# Patient Record
Sex: MALE | Race: WHITE | NOT HISPANIC OR LATINO | Employment: OTHER
[De-identification: names, ages, dates, MRNs, and addresses within clinical notes are randomized per-mention and may not be internally consistent; named-entity substitution may affect disease eponyms.]

---

## 2019-01-10 ENCOUNTER — RECORDS - HEALTHEAST (OUTPATIENT)
Dept: ADMINISTRATIVE | Facility: OTHER | Age: 79
End: 2019-01-10

## 2019-01-11 ENCOUNTER — HOSPITAL ENCOUNTER (OUTPATIENT)
Dept: ULTRASOUND IMAGING | Facility: CLINIC | Age: 79
Discharge: HOME | End: 2019-01-11
Attending: SURGERY

## 2019-01-11 DIAGNOSIS — K40.90 INGUINAL HERNIA: ICD-10-CM

## 2019-01-15 ENCOUNTER — RECORDS - HEALTHEAST (OUTPATIENT)
Dept: LAB | Facility: CLINIC | Age: 79
End: 2019-01-15

## 2019-01-15 LAB
AST SERPL W P-5'-P-CCNC: 21 U/L (ref 0–40)
POTASSIUM BLD-SCNC: 4 MMOL/L (ref 3.5–5)

## 2020-01-06 ENCOUNTER — RECORDS - HEALTHEAST (OUTPATIENT)
Dept: LAB | Facility: CLINIC | Age: 80
End: 2020-01-06

## 2020-01-06 LAB
ALBUMIN SERPL-MCNC: 4 G/DL (ref 3.5–5)
ALP SERPL-CCNC: 82 U/L (ref 45–120)
ALT SERPL W P-5'-P-CCNC: 32 U/L (ref 0–45)
ANION GAP SERPL CALCULATED.3IONS-SCNC: 10 MMOL/L (ref 5–18)
AST SERPL W P-5'-P-CCNC: 25 U/L (ref 0–40)
BILIRUB SERPL-MCNC: 0.6 MG/DL (ref 0–1)
BUN SERPL-MCNC: 17 MG/DL (ref 8–28)
CALCIUM SERPL-MCNC: 9 MG/DL (ref 8.5–10.5)
CHLORIDE BLD-SCNC: 106 MMOL/L (ref 98–107)
CHOLEST SERPL-MCNC: 181 MG/DL
CO2 SERPL-SCNC: 26 MMOL/L (ref 22–31)
CREAT SERPL-MCNC: 1.1 MG/DL (ref 0.7–1.3)
FASTING STATUS PATIENT QL REPORTED: YES
GFR SERPL CREATININE-BSD FRML MDRD: >60 ML/MIN/1.73M2
GLUCOSE BLD-MCNC: 94 MG/DL (ref 70–125)
HDLC SERPL-MCNC: 55 MG/DL
LDLC SERPL CALC-MCNC: 104 MG/DL
POTASSIUM BLD-SCNC: 3.9 MMOL/L (ref 3.5–5)
PROT SERPL-MCNC: 6.2 G/DL (ref 6–8)
PSA SERPL-MCNC: 5.6 NG/ML (ref 0–6.5)
SODIUM SERPL-SCNC: 142 MMOL/L (ref 136–145)
TRIGL SERPL-MCNC: 110 MG/DL

## 2020-09-30 ENCOUNTER — AMBULATORY - HEALTHEAST (OUTPATIENT)
Dept: SURGERY | Facility: CLINIC | Age: 80
End: 2020-09-30

## 2020-09-30 DIAGNOSIS — Z11.59 ENCOUNTER FOR SCREENING FOR OTHER VIRAL DISEASES: ICD-10-CM

## 2020-10-01 ENCOUNTER — AMBULATORY - HEALTHEAST (OUTPATIENT)
Dept: LAB | Facility: CLINIC | Age: 80
End: 2020-10-01

## 2020-10-01 ENCOUNTER — COMMUNICATION - HEALTHEAST (OUTPATIENT)
Dept: TELEHEALTH | Facility: CLINIC | Age: 80
End: 2020-10-01

## 2020-10-01 DIAGNOSIS — Z11.59 ENCOUNTER FOR SCREENING FOR OTHER VIRAL DISEASES: ICD-10-CM

## 2020-10-01 LAB
SARS-COV-2 PCR COMMENT: NORMAL
SARS-COV-2 RNA SPEC QL NAA+PROBE: NEGATIVE
SARS-COV-2 VIRUS SPECIMEN SOURCE: NORMAL

## 2020-10-02 ENCOUNTER — COMMUNICATION - HEALTHEAST (OUTPATIENT)
Dept: SCHEDULING | Facility: CLINIC | Age: 80
End: 2020-10-02

## 2020-10-05 ENCOUNTER — AMBULATORY - HEALTHEAST (OUTPATIENT)
Dept: SURGERY | Facility: CLINIC | Age: 80
End: 2020-10-05

## 2020-10-05 ENCOUNTER — COMMUNICATION - HEALTHEAST (OUTPATIENT)
Dept: SCHEDULING | Facility: CLINIC | Age: 80
End: 2020-10-05

## 2020-10-05 ENCOUNTER — AMBULATORY - HEALTHEAST (OUTPATIENT)
Dept: LAB | Facility: CLINIC | Age: 80
End: 2020-10-05

## 2020-10-05 DIAGNOSIS — Z11.59 ENCOUNTER FOR SCREENING FOR OTHER VIRAL DISEASES: ICD-10-CM

## 2020-10-06 ENCOUNTER — ANESTHESIA - HEALTHEAST (OUTPATIENT)
Dept: SURGERY | Facility: CLINIC | Age: 80
End: 2020-10-06

## 2020-10-06 ENCOUNTER — SURGERY - HEALTHEAST (OUTPATIENT)
Dept: SURGERY | Facility: CLINIC | Age: 80
End: 2020-10-06

## 2020-10-06 ASSESSMENT — MIFFLIN-ST. JEOR: SCORE: 1592.16

## 2020-10-12 ENCOUNTER — TELEPHONE (OUTPATIENT)
Dept: NURSING | Facility: CLINIC | Age: 80
End: 2020-10-12

## 2020-10-12 NOTE — TELEPHONE ENCOUNTER
Answered questions and advised to call back for further questions or problems.  Irma Price RN  [facility]

## 2021-02-03 ENCOUNTER — TRANSFERRED RECORDS (OUTPATIENT)
Dept: HEALTH INFORMATION MANAGEMENT | Facility: CLINIC | Age: 81
End: 2021-02-03

## 2021-02-03 ENCOUNTER — RECORDS - HEALTHEAST (OUTPATIENT)
Dept: LAB | Facility: CLINIC | Age: 81
End: 2021-02-03

## 2021-02-03 LAB
ALBUMIN SERPL-MCNC: 4 G/DL (ref 3.5–5)
ALP SERPL-CCNC: 83 U/L (ref 45–120)
ALT SERPL W P-5'-P-CCNC: 25 U/L (ref 0–45)
ANION GAP SERPL CALCULATED.3IONS-SCNC: 7 MMOL/L (ref 5–18)
AST SERPL W P-5'-P-CCNC: 20 U/L (ref 0–40)
BILIRUB SERPL-MCNC: 0.5 MG/DL (ref 0–1)
BUN SERPL-MCNC: 18 MG/DL (ref 8–28)
CALCIUM SERPL-MCNC: 8.7 MG/DL (ref 8.5–10.5)
CHLORIDE BLD-SCNC: 109 MMOL/L (ref 98–107)
CHOLEST SERPL-MCNC: 191 MG/DL
CO2 SERPL-SCNC: 26 MMOL/L (ref 22–31)
CREAT SERPL-MCNC: 1.09 MG/DL (ref 0.7–1.3)
FASTING STATUS PATIENT QL REPORTED: YES
GFR SERPL CREATININE-BSD FRML MDRD: >60 ML/MIN/1.73M2
GLUCOSE BLD-MCNC: 90 MG/DL (ref 70–125)
HDLC SERPL-MCNC: 54 MG/DL
LDLC SERPL CALC-MCNC: 118 MG/DL
POTASSIUM BLD-SCNC: 4 MMOL/L (ref 3.5–5)
PROT SERPL-MCNC: 6.1 G/DL (ref 6–8)
PSA SERPL-MCNC: 7 NG/ML (ref 0–6.5)
SODIUM SERPL-SCNC: 142 MMOL/L (ref 136–145)
TRIGL SERPL-MCNC: 93 MG/DL

## 2021-03-09 ENCOUNTER — TRANSFERRED RECORDS (OUTPATIENT)
Dept: HEALTH INFORMATION MANAGEMENT | Facility: CLINIC | Age: 81
End: 2021-03-09

## 2021-04-16 ENCOUNTER — TRANSFERRED RECORDS (OUTPATIENT)
Dept: HEALTH INFORMATION MANAGEMENT | Facility: CLINIC | Age: 81
End: 2021-04-16

## 2021-04-28 ENCOUNTER — TRANSFERRED RECORDS (OUTPATIENT)
Dept: HEALTH INFORMATION MANAGEMENT | Facility: CLINIC | Age: 81
End: 2021-04-28

## 2021-05-17 ENCOUNTER — RECORDS - HEALTHEAST (OUTPATIENT)
Dept: LAB | Facility: CLINIC | Age: 81
End: 2021-05-17

## 2021-05-17 LAB
ANION GAP SERPL CALCULATED.3IONS-SCNC: 11 MMOL/L (ref 5–18)
BUN SERPL-MCNC: 20 MG/DL (ref 8–28)
CALCIUM SERPL-MCNC: 8.8 MG/DL (ref 8.5–10.5)
CHLORIDE BLD-SCNC: 108 MMOL/L (ref 98–107)
CO2 SERPL-SCNC: 26 MMOL/L (ref 22–31)
CREAT SERPL-MCNC: 1.21 MG/DL (ref 0.7–1.3)
GFR SERPL CREATININE-BSD FRML MDRD: 58 ML/MIN/1.73M2
GLUCOSE BLD-MCNC: 91 MG/DL (ref 70–125)
POTASSIUM BLD-SCNC: 4.4 MMOL/L (ref 3.5–5)
SODIUM SERPL-SCNC: 145 MMOL/L (ref 136–145)

## 2021-05-25 ENCOUNTER — RECORDS - HEALTHEAST (OUTPATIENT)
Dept: LAB | Facility: CLINIC | Age: 81
End: 2021-05-25

## 2021-05-27 ENCOUNTER — TRANSFERRED RECORDS (OUTPATIENT)
Dept: HEALTH INFORMATION MANAGEMENT | Facility: CLINIC | Age: 81
End: 2021-05-27

## 2021-05-29 ENCOUNTER — RECORDS - HEALTHEAST (OUTPATIENT)
Dept: ADMINISTRATIVE | Facility: CLINIC | Age: 81
End: 2021-05-29

## 2021-06-05 VITALS — BODY MASS INDEX: 24.91 KG/M2 | HEIGHT: 72 IN | WEIGHT: 183.9 LBS

## 2021-06-12 NOTE — ANESTHESIA CARE TRANSFER NOTE
Last vitals:   Vitals:    10/06/20 1234   BP: 152/78   Pulse: 79   Resp: 16   Temp: 36.8  C (98.2  F)   SpO2: 100%     Patient's level of consciousness is drowsy  Spontaneous respirations: yes  Maintains airway independently: yes  Dentition unchanged: yes  Oropharynx: oropharynx clear of all foreign objects    QCDR Measures:  ASA# 20 - Surgical Safety Checklist: WHO surgical safety checklist completed prior to induction    PQRS# 430 - Adult PONV Prevention: 4558F - Pt received => 2 anti-emetic agents (different classes) preop & intraop  ASA# 8 - Peds PONV Prevention: NA - Not pediatric patient, not GA or 2 or more risk factors NOT present  PQRS# 424 - Jolene-op Temp Management: 4559F - At least one body temp DOCUMENTED => 35.5C or 95.9F within required timeframe  PQRS# 426 - PACU Transfer Protocol: - Transfer of care checklist used  ASA# 14 - Acute Post-op Pain: ASA14B - Patient did NOT experience pain >= 7 out of 10

## 2021-06-12 NOTE — ANESTHESIA PREPROCEDURE EVALUATION
Anesthesia Evaluation      Patient summary reviewed     Airway   Mallampati: III  Neck ROM: full   Pulmonary - negative ROS and normal exam                          Cardiovascular - normal exam  Exercise tolerance: > or = 4 METS  (+) hypertension, , hypercholesterolemia,     ECG reviewed        Neuro/Psych    (+) neuromuscular disease,      Endo/Other - negative ROS      GI/Hepatic/Renal - negative ROS           Dental - normal exam                        Anesthesia Plan  Planned anesthetic: general LMA  50 mg ketamine IV on induction.    ASA 2   Induction: intravenous   Anesthetic plan and risks discussed with: patient  Anesthesia plan special considerations: antiemetics,   Post-op plan: routine recovery

## 2021-06-12 NOTE — ANESTHESIA POSTPROCEDURE EVALUATION
Patient: Chet Kathleen  Procedure(s):  BILATERAL INGUINAL HERNIA REPAIR (Bilateral)  Anesthesia type: general    Patient location: Phase II Recovery  Last vitals: No vitals data found for the desired time range.    Post vital signs: stable  Level of consciousness: awake and responds to simple questions  Post-anesthesia pain: pain controlled  Post-anesthesia nausea and vomiting: no  Pulmonary: unassisted, return to baseline  Cardiovascular: stable and blood pressure at baseline  Hydration: adequate  Anesthetic events: no    QCDR Measures:  ASA# 11 - Jolene-op Cardiac Arrest: ASA11B - Patient did NOT experience unanticipated cardiac arrest  ASA# 12 - Jolene-op Mortality Rate: ASA12B - Patient did NOT die  ASA# 13 - PACU Re-Intubation Rate: ASA13B - Patient did NOT require a new airway mgmt  ASA# 10 - Composite Anes Safety: ASA10A - No serious adverse event    Additional Notes:

## 2022-01-27 ENCOUNTER — TRANSFERRED RECORDS (OUTPATIENT)
Dept: HEALTH INFORMATION MANAGEMENT | Facility: CLINIC | Age: 82
End: 2022-01-27

## 2022-03-31 ENCOUNTER — LAB REQUISITION (OUTPATIENT)
Dept: LAB | Facility: CLINIC | Age: 82
End: 2022-03-31
Payer: COMMERCIAL

## 2022-03-31 DIAGNOSIS — I10 ESSENTIAL (PRIMARY) HYPERTENSION: ICD-10-CM

## 2022-03-31 DIAGNOSIS — E78.5 HYPERLIPIDEMIA, UNSPECIFIED: ICD-10-CM

## 2022-03-31 LAB
ALBUMIN SERPL-MCNC: 3.9 G/DL (ref 3.5–5)
ALP SERPL-CCNC: 88 U/L (ref 45–120)
ALT SERPL W P-5'-P-CCNC: 24 U/L (ref 0–45)
ANION GAP SERPL CALCULATED.3IONS-SCNC: 12 MMOL/L (ref 5–18)
AST SERPL W P-5'-P-CCNC: 20 U/L (ref 0–40)
BILIRUB SERPL-MCNC: 0.6 MG/DL (ref 0–1)
BUN SERPL-MCNC: 23 MG/DL (ref 8–28)
CALCIUM SERPL-MCNC: 9.1 MG/DL (ref 8.5–10.5)
CHLORIDE BLD-SCNC: 108 MMOL/L (ref 98–107)
CHOLEST SERPL-MCNC: 190 MG/DL
CO2 SERPL-SCNC: 24 MMOL/L (ref 22–31)
CREAT SERPL-MCNC: 1.23 MG/DL (ref 0.7–1.3)
FASTING STATUS PATIENT QL REPORTED: NORMAL
GFR SERPL CREATININE-BSD FRML MDRD: 60 ML/MIN/1.73M2
GLUCOSE BLD-MCNC: 94 MG/DL (ref 70–125)
HDLC SERPL-MCNC: 52 MG/DL
LDLC SERPL CALC-MCNC: 121 MG/DL
POTASSIUM BLD-SCNC: 4.3 MMOL/L (ref 3.5–5)
PROT SERPL-MCNC: 6.5 G/DL (ref 6–8)
SODIUM SERPL-SCNC: 144 MMOL/L (ref 136–145)
TRIGL SERPL-MCNC: 84 MG/DL

## 2022-03-31 PROCEDURE — 80061 LIPID PANEL: CPT | Mod: ORL | Performed by: FAMILY MEDICINE

## 2022-03-31 PROCEDURE — 80053 COMPREHEN METABOLIC PANEL: CPT | Mod: ORL | Performed by: FAMILY MEDICINE

## 2022-05-26 ENCOUNTER — TRANSFERRED RECORDS (OUTPATIENT)
Dept: HEALTH INFORMATION MANAGEMENT | Facility: CLINIC | Age: 82
End: 2022-05-26

## 2022-06-29 ENCOUNTER — LAB REQUISITION (OUTPATIENT)
Dept: LAB | Facility: CLINIC | Age: 82
End: 2022-06-29
Payer: COMMERCIAL
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
## 2022-06-29 PROCEDURE — 86618 LYME DISEASE ANTIBODY: CPT | Mod: ORL | Performed by: FAMILY MEDICINE

## 2022-06-30 LAB — B BURGDOR IGG+IGM SER QL: 0.03

## 2022-11-11 ENCOUNTER — TRANSFERRED RECORDS (OUTPATIENT)
Dept: HEALTH INFORMATION MANAGEMENT | Facility: CLINIC | Age: 82
End: 2022-11-11

## 2022-11-16 ENCOUNTER — TRANSFERRED RECORDS (OUTPATIENT)
Dept: HEALTH INFORMATION MANAGEMENT | Facility: CLINIC | Age: 82
End: 2022-11-16

## 2022-11-18 ENCOUNTER — TRANSFERRED RECORDS (OUTPATIENT)
Dept: HEALTH INFORMATION MANAGEMENT | Facility: CLINIC | Age: 82
End: 2022-11-18

## 2022-11-29 ENCOUNTER — TRANSCRIBE ORDERS (OUTPATIENT)
Dept: ONCOLOGY | Facility: CLINIC | Age: 82
End: 2022-11-29

## 2022-11-29 ENCOUNTER — PATIENT OUTREACH (OUTPATIENT)
Dept: ONCOLOGY | Facility: CLINIC | Age: 82
End: 2022-11-29

## 2022-11-29 DIAGNOSIS — C61 MALIGNANT NEOPLASM OF PROSTATE (H): Primary | ICD-10-CM

## 2022-11-29 NOTE — PROGRESS NOTES
New Patient Oncology Nurse Navigator Note     Referring provider:   Gabino Martines MD   [facility]     Referred to (specialty): Medical Oncology & Radiation Oncology    Requested provider (if applicable): Dr. Watt & Dr. Evans     Date Referral Received: 11/29/22     Evaluation for : malignant tumor of prostate- high grade Delfino 9 disease with T3 disease at prostatectomy        Clinical History (per Nurse review of records provided):      Place of Service: [facility]   Admission Date: 5/27/2021  Surgeon: Dr. Gabino Martines  Surgical Procedure: Radical prostatectomy with pelvic lymph node biopsy    Rising PSA    Records Location (Care Everywhere, Media, etc.): Care Everywhere  [facility]      Records Needed: [facility]    I called Chet to discuss referral to oncology.  I spoke with both he and his wife.   Chet would like to be seen at  location.  It was unclear initially if med onc was needed as it came through as a referral to Dr. Watt.  We have the following plan:  Dr. Evans, 12/7/22, 1:30-2:30 pm, NP in person    I reviewed what to expect at this visit and have given the clinic phone number.  I let him know our scheduling team will be reaching out to finalize.  No other questions at this time.

## 2022-12-02 ENCOUNTER — TRANSFERRED RECORDS (OUTPATIENT)
Dept: HEALTH INFORMATION MANAGEMENT | Facility: CLINIC | Age: 82
End: 2022-12-02

## 2022-12-02 NOTE — TELEPHONE ENCOUNTER
Imaging Received  2022 8:24 AM ABT   Action: Images from [facility] received and resolved to PACS.     Action 2022 9:22 AM ABT   Action Taken Called and spoke to pt spouse, patient is not awake and took message for patient to return call back.    9:35 AM  Patient states that he has never had radiation treatment but had an appt with [facility] a few weeks ago w/ Dr. Robbin Collado w/ [facility] HIM states that PJ is needed.     PJ sent to pt email [email or web address]    10:37AM  PJ received and sent to [facility] for [facility]. PJ sent to HIM for upload    3:58 PM  [facility] and [facility] received and sent to HIM for upload     MEDICAL RECORDS REQUEST   Radiation Oncology  [address] [place] [address]  PHONE: [phone]  Fax: [phone]        FUTURE VISIT INFORMATION                                                   Chet Kathleen, : 1940 scheduled for future visit at [facility]    APPOINTMENT INFORMATION:    Date: 22    Provider:  Dr. Deneen Evans    Reason for Visit/Diagnosis: Malignant neoplasm of prostate (H) [C61]    RECORDS REQUESTED FOR VISIT                                                     NOTES STATUS DETAILS   OFFICE NOTE from referring provider Rec -[facility] 22: Dr. Gabino Martines   OPERATIVE REPORT [facility] 21: DAVINCI ROBOTIC PROSTATECTOMY, BILATERAL PELVIC LYMPH NODE DISSECTION  21: Prostate biopsy   MEDICATION LIST     LABS     PATHOLOGY REPORTS Report in [facility]:  21: [id number]    [facility]:  21: MS-   ANYTHING RELATED TO DIAGNOSIS Epic Most recent 22   IMAGING (NEED IMAGES & REPORT)     CT SCANS PACS [facility]:  22, 21: CT Abd Pel   XRAYS PACS [facility]:  21: [place] Bone Scan   PREVIOUS RADIATION  (consult only)   WHAT HEALTHCARE FACILITY External: [facility] Dr. Siegel   RADIATION ONCOLOGIST NOTES  22: Dr. Maya Siegel   RADIATION TREATMENT SUMMARY NOTES     RADIATION  TREATMENT PLAN     DVH = DOSE VOLUME HISTOGRAM     DICOM CD (TX PLANNING CT, TX PLAN, STRUCTURE SET) *If no DICOM avail ask for DRRs     *[facility] and [facility] patients to [facility] with ATTN: FRANKLIN/Gissell Dosi/Physics    *only [facility] patient's, use [facility]

## 2022-12-07 ENCOUNTER — OFFICE VISIT (OUTPATIENT)
Dept: RADIATION ONCOLOGY | Facility: CLINIC | Age: 82
End: 2022-12-07
Attending: UROLOGY
Payer: COMMERCIAL

## 2022-12-07 ENCOUNTER — PRE VISIT (OUTPATIENT)
Dept: RADIATION ONCOLOGY | Facility: CLINIC | Age: 82
End: 2022-12-07

## 2022-12-07 VITALS
SYSTOLIC BLOOD PRESSURE: 137 MMHG | RESPIRATION RATE: 16 BRPM | DIASTOLIC BLOOD PRESSURE: 79 MMHG | HEART RATE: 67 BPM | OXYGEN SATURATION: 96 % | TEMPERATURE: 98.1 F

## 2022-12-07 DIAGNOSIS — C61 MALIGNANT NEOPLASM OF PROSTATE (H): ICD-10-CM

## 2022-12-07 PROCEDURE — G0463 HOSPITAL OUTPT CLINIC VISIT: HCPCS

## 2022-12-07 PROCEDURE — 99205 OFFICE O/P NEW HI 60 MIN: CPT | Performed by: RADIOLOGY

## 2022-12-07 RX ORDER — EPINEPHRINE 0.3 MG/.3ML
0.3 INJECTION SUBCUTANEOUS
COMMUNITY
End: 2023-04-05

## 2022-12-07 RX ORDER — LISINOPRIL 10 MG/1
TABLET ORAL
COMMUNITY

## 2022-12-07 RX ORDER — LORATADINE 10 MG/1
10 TABLET ORAL
COMMUNITY

## 2022-12-07 RX ORDER — ZOLPIDEM TARTRATE 5 MG/1
TABLET ORAL
COMMUNITY

## 2022-12-07 NOTE — PROGRESS NOTES
Patient here ambulatory accompanied by wife, Gurinder, for radiation consult for his recurrent prostate cancer.  Patient having rising PSA.  15 minutes spent in review of radiation process and potential side effects.  Written information given for review.  Seen by Dr. Evans.  Plan return to clinic for follow up as directed by physician.    No pacemaker  No prior radiation therapy    Oncology Rooming Note    December 7, 2022 1:49 PM   Chet Kathleen is a 82 year old male who presents for:    Chief Complaint   Patient presents with     Oncology Clinic Visit     Consult with Dr. Evans     Initial Vitals: /79 (BP Location: Right arm, Patient Position: Sitting, Cuff Size: Adult Regular)   Pulse 67   Temp 98.1  F (36.7  C) (Oral)   Resp 16   SpO2 96%  Estimated body mass index is 24.94 kg/m  as calculated from the following:    Height as of 10/6/20: 1.829 m (6').    Weight as of 10/6/20: 83.4 kg (183 lb 14.4 oz). There is no height or weight on file to calculate BSA.  No Pain (0) Comment: Data Unavailable   No LMP for male patient.  Allergies reviewed: Yes  Medications reviewed: Yes    Medications: Medication refills not needed today.  Pharmacy name entered into [employer]: [id number] [place] [id number] [facility] [address] [facility]    Clinical concerns: Consult for prostate cancer, seen by Dr. Siegel but wanted daily treatment closer to home Dr. Evans was notified.     Radiation Therapy Patient Education    Person involved with teaching: Patient and Wife    Patient educational needs for self management of treatment-related side effects assessment completed.  [facility] Patient Ed tab contains Patient Learning Assessment    Education Materials Given  Radiation Therapy and You, Understanding Radiation Therapy, Radiation Therapy Team, Radiation Therapy Treatment, Radiation Therapy: Managing Short-Term Side Effects, Skin Care During Radiation Therapy, Radiation Therapy: Your Daily Life,  Full Bladder Instructions, Radiation Therapy to the Male Pelvis Guidelines, Welcome Letter, Insurance PA Information and Map [facility]    Educational Topics Discussed  Side effects expected and Activity    Response To Teaching  More review necessary    GYN Only  Vaginal Dilator-given and educated: N/A    Referrals sent: None    Chemotherapy?  No          Serena Daigle RN

## 2022-12-07 NOTE — LETTER
12/7/2022         RE: Chet Kathleen  [address] [place] [address]        Dear Colleague,    Thank you for referring your patient, Chet Kathleen, to the [facility] RADIATION ONCOLOGY [place]. Please see a copy of my visit note below.    [facility] Radiation Oncology Consult Note     Patient: Chet Kathleen  MRN: [id number]  Date of Service: 12/07/2022          Gabino Martines MD  [facility] UROLOGY  [address]  [place] [address]       Dear Dr. Martines:    Thank you very much for referring this patient for consideration of radiotherapy. As you know Mr. Kathleen is a 82 year old male with a diagnosis of high risk prostate cancer, pathologic stage T3b N0 M0, Crawford grade 4+5 = 9 involving both side of prostate gland and a pretherapy PSA of 7.0.  Patient is status post robotic assisted prostatectomy with pathology showed evidence of seminal vesicle invasion and positive margin in the left anterior apex with 4 negative lymph nodes.  Patient presented with a recent history of rising PSA to 0.14.  He is referred to radiation oncology for evaluation and consideration of salvage radiation therapy.    HISTORY OF PRESENT ILLNESS:   Mr. Kathleen is a 82 year old male who has been in his usual state of health until recently.  The patient presented with abnormal elevation of PSA from 5.6 in 1/2020 to 7.0 on 2/3/2021 for which he was seeking further evaluation.  Transrectal ultrasound study and biopsy on 4/16/2021 reviewed adenocarcinoma, Delfino grade 4+5 = 9, 4+3 = 7 and 3+3 = 6 involving both side of prostate gland.  Patient underwent staging work-up including CT abdomen pelvis and bone scan on 4/28/2021 which showed no evidence of lymph nodes and systemic metastasis.  Patient received robotic assisted radical prostatectomy on 5/27/2021.  The pathology revealed prostatic adenocarcinoma, Crawford grade 4+5 = 9 involving both side of prostate gland with evidence of extraprostatic  extension in the right anterior base and the left anterior apex, tumor invasion into left and right seminal vesicle and right vas deferens, and positive margin in the left anterior apex.  There is also evidence of lymphovascular invasion.  4 removed pelvic lymph nodes showed no evidence of metastatic disease.  Patient has been recovering well since surgery.  He has only occasional stress incontinence and wearing a pad as needed.  He is PSA has been undetectable since surgery.  He was find to have detectable PSA to 0.14 on 11/11/2022.  There is no additional staging work-up done. The patient is referred to radiation oncology for evaluation and consideration of possible salvage radiation therapy.    CHEMOTHERAPY HISTORY: Concurrent Chemotherapy: No    RADIATION THERAPY HISTORY: Prior Radiation: No    IMPLANTED CARDIAC DEVICE: none     Current Outpatient Medications   Medication Sig Dispense Refill     ACETAMINOPHEN PO Take 650 mg by mouth as needed       AMLODIPINE BESYLATE PO Take 10 mg by mouth daily       carBAMazepine (TEGRETOL) 200 MG tablet Take 200 mg by mouth every morning       CARBAMAZEPINE PO Take 100 mg by mouth as needed May take an additional dose midday if needed for jaw pain.       CARBAMAZEPINE 100 mg every evening 1/2 of 200 mg tablet       citalopram (CELEXA) 20 MG tablet Take 20 mg by mouth At Bedtime       ibuprofen (ADVIL,MOTRIN) 200 MG tablet Take 200 mg by mouth every 6 hours as needed       lisinopril (ZESTRIL) 10 MG tablet lisinopril 10 mg tablet   TAKE 1 TABLET BY MOUTH EVERY DAY       loratadine (CLARITIN) 10 MG tablet Take 10 mg by mouth       OMEPRAZOLE PO Take 20 mg by mouth 2 times daily       SIMVASTATIN PO Take 20 mg by mouth daily       zolpidem (AMBIEN) 5 MG tablet zolpidem 5 mg tablet       EPINEPHrine (ANY BX GENERIC EQUIV) 0.3 MG/0.3ML injection 2-pack Inject 0.3 mg into the muscle (Patient not taking: Reported on 12/7/2022)       EPINEPHrine (EPIPEN) 0.3 MG/0.3ML injection  Inject 0.3 mg into the muscle once as needed For bee stings (Patient not taking: Reported on 12/7/2022)       Past Medical History:   Diagnosis Date     Cancer (H)     Thyroid     Hyperlipidemia      Hypertension      Trigeminal neuralgia      Past Surgical History:   Procedure Laterality Date     CHOLECYSTECTOMY       INGUINAL HERNIA REPAIR Bilateral 10/6/2020    Procedure: BILATERAL INGUINAL HERNIA REPAIR;  Surgeon: Melvin Allan MD;  Location: [facility];  Service: General     THYROIDECTOMY       Animal dander, Augmentin, Grass extracts [gramineae pollens], and Trazodone  No family history on file.  Social History     Socioeconomic History     Marital status:      Spouse name: Not on file     Number of children: Not on file     Years of education: Not on file     Highest education level: Not on file   Occupational History     Not on file   Tobacco Use     Smoking status: Never     Smokeless tobacco: Never   Substance and Sexual Activity     Alcohol use: Not Currently     Drug use: Never     Sexual activity: Not on file   Other Topics Concern     Not on file   Social History Narrative     Not on file     Social Determinants of Health     Financial Resource Strain: Not on file   Food Insecurity: Not on file   Transportation Needs: Not on file   Physical Activity: Not on file   Stress: Not on file   Social Connections: Not on file   Intimate Partner Violence: Not on file   Housing Stability: Not on file        REVIEW OF SYMPTOMS:  A full 14-point review of systems was performed. Pertinent findings are noted in the HPI.    General  Constitutional  Constitutional (WDL): All constitutional elements are within defined limits  EENT  Eye Disorders  Eye Disorder (WDL): All eye disorder elements are within defined limits (wears reading glasses)  Ear Disorders  Ear Disorder (WDL): Exceptions to WDL  Tinnitus: Mild symptoms OR intervention not indicated (bilateral  ears)  Respiratory  Respiratory  Respiratory (WDL): All respiratory elements are within defined limits  Cardiovascular  Cardiovascular  Cardiovascular (WDL): All cardiovascular elements are within defined limits (no pacemaker)  Gastrointestinal  Gastrointestinal  Gastrointestinal (WDL): Exceptions to WDL  Diarrhea: Increase of less than 4 stools per day over baseline OR mild increase in ostomy output compared to baseline  Musculoskeletal  Musculoskeletal and Connective Tissue Disorders  Musculoskeletal & Connective (WDL): All musculoskeletal & connective elements are within defined limits  Integumentary  Integumentary  Integumentary (WDL): All integumentary elements are within defined limits  Neurological  Neurosensory  Neurosensory (WDL): Exceptions to WDL  Peripheral Sensory Neuropathy: Absent or within normal limits (occasional left foot)  Genitourinary/Reproductive  Genitourinary  Genitourinary (WDL): Exceptions to WDL  Urinary Frequency: Present (nocturia x2)  Lymphatic  Lymph System Disorders  Lymph (WDL): All lymph elements are within defined limits  Pain  Pain Score: No Pain (0)  AUA Assessment  Over the Past Month  How often have you had a sensation of not emptying your bladder completely after you have finished urinating: Not at all, Score: 0  How often have you had to urinate again less than 2 hours after you finshed urinating: Less than half the time: Score: 2  How often have you found you stopped and started again several times when you urinated: Not at all: Score: 0  How often have you found it difficult to postpone urine: Not at all: Score: 0  How often have you had a weak urinary stream: Not at all: Score: 0  How often have you had to push or starin to begin uriation: Not at all: Score: 0  How many times do you most typically get up to urinate from the time you went to bed at night until the time you got up in the morning?: 2 times, Score: 2  Total AUA Score: Degree of Severity: 0-7 in MILD (4)  If  you had to spend the rest of your life with your urinary condition just the way it is now, how would you feel: Equally Satisfied & Dissatisfied  Please check the appropriate box that describes your ability to have an erection: Unable to have an erection                                                           Accompanied by  Accompanied By: spouse (wife)    ECOG Status: 1    Prostate Specific Antigen Screen   Date Value Ref Range Status   02/03/2021 7.0 (H) 0.0 - 6.5 ng/mL Final   01/06/2020 5.6 0.0 - 6.5 ng/mL Final     PSA: 0.14 11/11/2022    Pathology: Reviewed    Objective:     PHYSICAL EXAMINATION:    /79 (BP Location: Right arm, Patient Position: Sitting, Cuff Size: Adult Regular)   Pulse 67   Temp 98.1  F (36.7  C) (Oral)   Resp 16   SpO2 96%     Gen: Alert, in NAD  Neck: Supple, full ROM, no LAD  Pulm: No wheezing, stridor or respiratory distress  CV: Well-perfused, no cyanosis, no pedal edema  Back: No step-offs or pain to palpation along the thoracolumbar spine  Rectal: Deferred  : Deferred  Musculoskeletal: Normal muscle bulk and tone  Skin: Normal color and turgor  Neurologic: A/Ox3, CN II-XII intact, normal gait and station  Psychiatric: Appropriate mood and affect    Intent of Therapy: Curative  Side effects that may occur during or within weeks after Radiation Therapy      Fatigue and general weakness    Nausea, vomiting and decrease in appetite    Pain on urination, frequent urge to urinate, blood in the urine, difficulty starting and decrease in the urine stream, retention of urine, and leakage of urine    Diarrhea, frequent, urgent and painful bowel movements, and blood in the stool    Darkening, irritation, itchiness, redness, dryness,and peeling of the skin of the pelvis     Loss of hair on the pelvis and groin    Side effects that may occur months or years after Radiation Therapy      Development of another tumor or cancer    Thickening, telangiectasias (development of spider like  blood vessels in the skin) and ulceration of the skin of the pelvis    Decrease in function of the kidneys and liver    Ulcer and bleeding from the intestine or rectum    Obstruction of the bowel    Fistula of the rectum    Swelling of the feet and legs    Poor healing after a trauma or surgery in the irradiated area    Nerve damage resulting in loss of strength and sensation    Infertiility (sterility)    Painful ejaculation or impotence    The risks, benefits and alternatives to radiation therapy were outlined with the patient. All questions were answered and a consent was signed.       Impression     High risk prostate cancer, pathologic stage T3b N0 M0, Delfino grade 4+5 = 9 involving both side of prostate gland and a pretherapy PSA of 7.0.  Patient is status post robotic assisted prostatectomy with pathology showed evidence of seminal vesicle invasion and positive margin in the left anterior apex with 4 negative lymph nodes.  Patient presented with a recent history of rising PSA to 0.14.      Assessment & Plan:     I have personally reviewed his upcoming medical record today.  This is a 82-year-old gentleman with a diagnosis of high risk prostate cancer status post robotic radical prostatectomy with recent evidence of rising PSA.  This is consistent with biochemical recurrence of the prostate cancer.  The possible treatment options including surgery, systemic therapy, and radiation therapy has been discussed with patient in detail and at great lengths.  The possible risks and side effects of radiation therapy has also been explained to the patient.  Questions are answered to patient satisfaction.  I agree the patient a good candidate to considering salvage radiation therapy for his presumably local regional recurrence of the prostate cancer.  The pros and cons of different options has also been discussed with the patient.  We will repeat his PSA next week and get PSMA PET scan for restaging.  Patient is  scheduled to return to radiation oncology 1 week after for further evaluation and discussion of final treatment recommendation.  I think the patient will be a good candidate to consider 6 months to 2 years hormonal therapy with definitive external beam radiation therapy to a total dose of 7000/4400 cGy in 35/22 treatments targeted to the prostate bed/pelvic lymph nodes using IMRT/IGRT.    Again, thank you very much for the referral and allowing me to participate in the care of this patient.  If you have any questions or concerns about this consultation, please do not hesitate to call.  I spent approximately 60 minutes today with the patient and 80% time was used for counseling.      Sincerely,          Deneen Evans MD, PhD  Department of Radiation Oncology   [facility] Radiation Oncology  Tel: [phone]  Page: [phone]    [facility]  [address] [place] [address]     [address] [place] [address]    CC:  Patient Care Team:  Ashlyn Barnes MD as PCP - General (Family Practice)  Deneen Evans MD as MD (Radiation Oncology)  Gabino Martines MD as MD (Urology)        Patient here ambulatory accompanied by wife, Gurinder, for radiation consult for his recurrent prostate cancer.  Patient having rising PSA.  15 minutes spent in review of radiation process and potential side effects.  Written information given for review.  Seen by Dr. Evans.  Plan return to clinic for follow up as directed by physician.    No pacemaker  No prior radiation therapy    Oncology Rooming Note    December 7, 2022 1:49 PM   Chet Kathleen is a 82 year old male who presents for:    Chief Complaint   Patient presents with     Oncology Clinic Visit     Consult with Dr. Evans     Initial Vitals: /79 (BP Location: Right arm, Patient Position: Sitting, Cuff Size: Adult Regular)   Pulse 67   Temp 98.1  F (36.7  C) (Oral)   Resp 16   SpO2 96%  Estimated body mass index is 24.94 kg/m  as  calculated from the following:    Height as of 10/6/20: 1.829 m (6').    Weight as of 10/6/20: 83.4 kg (183 lb 14.4 oz). There is no height or weight on file to calculate BSA.  No Pain (0) Comment: Data Unavailable   No LMP for male patient.  Allergies reviewed: Yes  Medications reviewed: Yes    Medications: Medication refills not needed today.  Pharmacy name entered into [facility]: [id number] [place] [phone]  [employer] AT [email or web address]    Clinical concerns: Consult for prostate cancer, seen by Dr. Siegel but wanted daily treatment closer to home Dr. Evans was notified.     Radiation Therapy Patient Education    Person involved with teaching: Patient and Wife    Patient educational needs for self management of treatment-related side effects assessment completed.  [facility] Patient Ed tab contains Patient Learning Assessment    Education Materials Given  Radiation Therapy and You, Understanding Radiation Therapy, Radiation Therapy Team, Radiation Therapy Treatment, Radiation Therapy: Managing Short-Term Side Effects, Skin Care During Radiation Therapy, Radiation Therapy: Your Daily Life, Full Bladder Instructions, Radiation Therapy to the Male Pelvis Guidelines, Welcome Letter, Insurance PA Information and Map [facility]    Educational Topics Discussed  Side effects expected and Activity    Response To Teaching  More review necessary    GYN Only  Vaginal Dilator-given and educated: N/A    Referrals sent: None    Chemotherapy?  No          Serena Daigle RN                Again, thank you for allowing me to participate in the care of your patient.        Sincerely,        Deneen Evans MD

## 2022-12-07 NOTE — PROGRESS NOTES
[facility] Radiation Oncology Consult Note     Patient: Chet Kathleen  MRN: [id number]  Date of Service: 12/07/2022          Gabino Martines MD  [facility] UROLOGY  [address] [address]       Dear Dr. Martines:    Thank you very much for referring this patient for consideration of radiotherapy. As you know Mr. 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  Patient is status post robotic assisted prostatectomy with pathology showed evidence of seminal vesicle invasion and positive margin in the left anterior apex with 4 negative lymph nodes.  Patient presented with a recent history of rising PSA to 0.14.  He is referred to radiation oncology for evaluation and consideration of salvage radiation therapy.    HISTORY OF PRESENT ILLNESS:   Mr. Kathleen is a 82 year old male who has been in his usual state of health until recently.  The patient presented with abnormal elevation of PSA from 5.6 in 1/2020 to 7.0 on 2/3/2021 for which he was seeking further evaluation.  Transrectal ultrasound study and biopsy on 4/16/2021 reviewed adenocarcinoma, Delfino grade 4+5 = 9, 4+3 = 7 and 3+3 = 6 involving both side of prostate gland.  Patient underwent staging work-up including CT abdomen pelvis and bone scan on 4/28/2021 which showed no evidence of lymph nodes and systemic metastasis.  Patient received robotic assisted radical prostatectomy on 5/27/2021.  The pathology revealed prostatic adenocarcinoma, [place] grade 4+5 = 9 involving both side of prostate gland with evidence of extraprostatic extension in the right anterior base and the left anterior apex, tumor invasion into left and right seminal vesicle and right vas deferens, and positive margin in the left anterior apex.  There is also evidence of lymphovascular invasion.  4 removed pelvic lymph nodes  showed no evidence of metastatic disease.  Patient has been recovering well since surgery.  He has only occasional stress incontinence and wearing a pad as needed.  He is PSA has been undetectable since surgery.  He was find to have detectable PSA to 0.14 on 11/11/2022.  There is no additional staging work-up done. The patient is referred to radiation oncology for evaluation and consideration of possible salvage radiation therapy.    CHEMOTHERAPY HISTORY: Concurrent Chemotherapy: No    RADIATION THERAPY HISTORY: Prior Radiation: No    IMPLANTED CARDIAC DEVICE: none     Current Outpatient Medications   Medication Sig Dispense Refill     ACETAMINOPHEN PO Take 650 mg by mouth as needed       AMLODIPINE BESYLATE PO Take 10 mg by mouth daily       carBAMazepine (TEGRETOL) 200 MG tablet Take 200 mg by mouth every morning       CARBAMAZEPINE PO Take 100 mg by mouth as needed May take an additional dose midday if needed for jaw pain.       CARBAMAZEPINE 100 mg every evening 1/2 of 200 mg tablet       citalopram (CELEXA) 20 MG tablet Take 20 mg by mouth At Bedtime       ibuprofen (ADVIL,MOTRIN) 200 MG tablet Take 200 mg by mouth every 6 hours as needed       lisinopril (ZESTRIL) 10 MG tablet lisinopril 10 mg tablet   TAKE 1 TABLET BY MOUTH EVERY DAY       loratadine (CLARITIN) 10 MG tablet Take 10 mg by mouth       OMEPRAZOLE PO Take 20 mg by mouth 2 times daily       SIMVASTATIN PO Take 20 mg by mouth daily       zolpidem (AMBIEN) 5 MG tablet zolpidem 5 mg tablet       EPINEPHrine (ANY BX GENERIC EQUIV) 0.3 MG/0.3ML injection 2-pack Inject 0.3 mg into the muscle (Patient not taking: Reported on 12/7/2022)       EPINEPHrine (EPIPEN) 0.3 MG/0.3ML injection Inject 0.3 mg into the muscle once as needed For bee stings (Patient not taking: Reported on 12/7/2022)       Past Medical History:   Diagnosis Date     Cancer (H)     Thyroid     Hyperlipidemia      Hypertension      Trigeminal neuralgia      Past Surgical History:    Procedure Laterality Date     CHOLECYSTECTOMY       INGUINAL HERNIA REPAIR Bilateral 10/6/2020    Procedure: BILATERAL INGUINAL HERNIA REPAIR;  Surgeon: Melvin Allan MD;  Location: [facility] OR;  Service: General     THYROIDECTOMY       Animal dander, Augmentin, Grass extracts [gramineae pollens], and Trazodone  No family history on file.  Social History     Socioeconomic History     Marital status:      Spouse name: Not on file     Number of children: Not on file     Years of education: Not on file     Highest education level: Not on file   Occupational History     Not on file   Tobacco Use     Smoking status: Never     Smokeless tobacco: Never   Substance and Sexual Activity     Alcohol use: Not Currently     Drug use: Never     Sexual activity: Not on file   Other Topics Concern     Not on file   Social History Narrative     Not on file     Social Determinants of Health     Financial Resource Strain: Not on file   Food Insecurity: Not on file   Transportation Needs: Not on file   Physical Activity: Not on file   Stress: Not on file   Social Connections: Not on file   Intimate Partner Violence: Not on file   Housing Stability: Not on file        REVIEW OF SYMPTOMS:  A full 14-point review of systems was performed. Pertinent findings are noted in the HPI.    General  Constitutional  Constitutional (WDL): All constitutional elements are within defined limits  EENT  Eye Disorders  Eye Disorder (WDL): All eye disorder elements are within defined limits (wears reading glasses)  Ear Disorders  Ear Disorder (WDL): Exceptions to WDL  Tinnitus: Mild symptoms OR intervention not indicated (bilateral ears)  Respiratory  Respiratory  Respiratory (WDL): All respiratory elements are within defined limits  Cardiovascular  Cardiovascular  Cardiovascular (WDL): All cardiovascular elements are within defined limits (no pacemaker)  Gastrointestinal  Gastrointestinal  Gastrointestinal (WDL): Exceptions to  WDL  Diarrhea: Increase of less than 4 stools per day over baseline OR mild increase in ostomy output compared to baseline  Musculoskeletal  Musculoskeletal and Connective Tissue Disorders  Musculoskeletal & Connective (WDL): All musculoskeletal & connective elements are within defined limits  Integumentary  Integumentary  Integumentary (WDL): All integumentary elements are within defined limits  Neurological  Neurosensory  Neurosensory (WDL): Exceptions to WDL  Peripheral Sensory Neuropathy: Absent or within normal limits (occasional left foot)  Genitourinary/Reproductive  Genitourinary  Genitourinary (WDL): Exceptions to WDL  Urinary Frequency: Present (nocturia x2)  Lymphatic  Lymph System Disorders  Lymph (WDL): All lymph elements are within defined limits  Pain  Pain Score: No Pain (0)  AUA Assessment  Over the Past Month  How often have you had a sensation of not emptying your bladder completely after you have finished urinating: Not at all, Score: 0  How often have you had to urinate again less than 2 hours after you finshed urinating: Less than half the time: Score: 2  How often have you found you stopped and started again several times when you urinated: Not at all: Score: 0  How often have you found it difficult to postpone urine: Not at all: Score: 0  How often have you had a weak urinary stream: Not at all: Score: 0  How often have you had to push or starin to begin uriation: Not at all: Score: 0  How many times do you most typically get up to urinate from the time you went to bed at night until the time you got up in the morning?: 2 times, Score: 2  Total AUA Score: Degree of Severity: 0-7 in MILD (4)  If you had to spend the rest of your life with your urinary condition just the way it is now, how would you feel: Equally Satisfied & Dissatisfied  Please check the appropriate box that describes your ability to have an erection: Unable to have an erection                                                            Accompanied by  Accompanied By: spouse (wife)    ECOG Status: 1    Prostate Specific Antigen Screen   Date Value Ref Range Status   02/03/2021 7.0 (H) 0.0 - 6.5 ng/mL Final   01/06/2020 5.6 0.0 - 6.5 ng/mL Final     PSA: 0.14 11/11/2022    Pathology: Reviewed    Objective:     PHYSICAL EXAMINATION:    /79 (BP Location: Right arm, Patient Position: Sitting, Cuff Size: Adult Regular)   Pulse 67   Temp 98.1  F (36.7  C) (Oral)   Resp 16   SpO2 96%     Gen: Alert, in NAD  Neck: Supple, full ROM, no LAD  Pulm: No wheezing, stridor or respiratory distress  CV: Well-perfused, no cyanosis, no pedal edema  Back: No step-offs or pain to palpation along the thoracolumbar spine  Rectal: Deferred  : Deferred  Musculoskeletal: Normal muscle bulk and tone  Skin: Normal color and turgor  Neurologic: A/Ox3, CN II-XII intact, normal gait and station  Psychiatric: Appropriate mood and affect    Intent of Therapy: Curative  Side effects that may occur during or within weeks after Radiation Therapy      Fatigue and general weakness    Nausea, vomiting and decrease in appetite    Pain on urination, frequent urge to urinate, blood in the urine, difficulty starting and decrease in the urine stream, retention of urine, and leakage of urine    Diarrhea, frequent, urgent and painful bowel movements, and blood in the stool    Darkening, irritation, itchiness, redness, dryness,and peeling of the skin of the pelvis     Loss of hair on the pelvis and groin    Side effects that may occur months or years after Radiation Therapy      Development of another tumor or cancer    Thickening, telangiectasias (development of spider like blood vessels in the skin) and ulceration of the skin of the pelvis    Decrease in function of the kidneys and liver    Ulcer and bleeding from the intestine or rectum    Obstruction of the bowel    Fistula of the rectum    Swelling of the feet and legs    Poor healing after a trauma or surgery in the  irradiated area    Nerve damage resulting in loss of strength and sensation    Infertiility (sterility)    Painful ejaculation or impotence    The risks, benefits and alternatives to radiation therapy were outlined with the patient. All questions were answered and a consent was signed.       Impression     High risk prostate cancer, pathologic stage T3b N0 M0, [place] grade 4+5 = 9 involving both side of prostate gland and a pretherapy PSA of 7.0.  Patient is status post robotic assisted prostatectomy with pathology showed evidence of seminal vesicle invasion and positive margin in the left anterior apex with 4 negative lymph nodes.  Patient presented with a recent history of rising PSA to 0.14.      Assessment & Plan:     I have personally reviewed his upcoming medical record today.  This is a 82-year-old gentleman with a diagnosis of high risk prostate cancer status post robotic radical prostatectomy with recent evidence of rising PSA.  This is consistent with biochemical recurrence of the prostate cancer.  The possible treatment options including surgery, systemic therapy, and radiation therapy has been discussed with patient in detail and at great lengths.  The possible risks and side effects of radiation therapy has also been explained to the patient.  Questions are answered to patient satisfaction.  I agree the patient a good candidate to considering salvage radiation therapy for his presumably local regional recurrence of the prostate cancer.  The pros and cons of different options has also been discussed with the patient.  We will repeat his PSA next week and get PSMA PET scan for restaging.  Patient is scheduled to return to radiation oncology 1 week after for further evaluation and discussion of final treatment recommendation.  I think the patient will be a good candidate to consider 6 months to 2 years hormonal therapy with definitive external beam radiation therapy to a total dose of 7000/4400 cGy in  35/22 treatments targeted to the prostate bed/pelvic lymph nodes using IMRT/IGRT.    Again, thank you very much for the referral and allowing me to participate in the care of this patient.  If you have any questions or concerns about this consultation, please do not hesitate to call.  I spent approximately 60 minutes today with the patient and 80% time was used for counseling.      Sincerely,          Deneen Evans MD, PhD  Department of Radiation Oncology   [facility] Radiation Oncology  Tel: [phone]  Page: [phone]    [facility]  [address] [place] [address]     [address] [place] [address]    CC:  Patient Care Team:  Ashlyn Barnes MD as PCP - General (Family Practice)  Deneen Evans MD as MD (Radiation Oncology)  Gabino Martines MD as MD (Urology)

## 2022-12-12 ENCOUNTER — LAB (OUTPATIENT)
Dept: INFUSION THERAPY | Facility: CLINIC | Age: 82
End: 2022-12-12
Attending: RADIOLOGY
Payer: COMMERCIAL

## 2022-12-12 DIAGNOSIS — C61 MALIGNANT NEOPLASM OF PROSTATE (H): ICD-10-CM

## 2022-12-12 LAB — PSA SERPL-MCNC: 0.14 NG/ML

## 2022-12-12 PROCEDURE — 84153 ASSAY OF PSA TOTAL: CPT

## 2022-12-12 PROCEDURE — 36415 COLL VENOUS BLD VENIPUNCTURE: CPT

## 2022-12-28 ENCOUNTER — HOSPITAL ENCOUNTER (OUTPATIENT)
Dept: PET IMAGING | Facility: CLINIC | Age: 82
Discharge: HOME | End: 2022-12-28
Attending: RADIOLOGY | Admitting: RADIOLOGY
Payer: COMMERCIAL

## 2022-12-28 DIAGNOSIS — C61 MALIGNANT NEOPLASM OF PROSTATE (H): ICD-10-CM

## 2022-12-28 PROCEDURE — 343N000001 HC RX 343: Performed by: RADIOLOGY

## 2022-12-28 PROCEDURE — 78815 PET IMAGE W/CT SKULL-THIGH: CPT | Mod: 26

## 2022-12-28 PROCEDURE — A9800 HC RX 343: HCPCS | Performed by: RADIOLOGY

## 2022-12-28 PROCEDURE — 78815 PET IMAGE W/CT SKULL-THIGH: CPT | Mod: PS

## 2022-12-28 RX ADMIN — KIT FOR THE PREPARATION OF GALLIUM GA 68 GOZETOTIDE 5.1 MILLICURIE: 25 INJECTION, POWDER, LYOPHILIZED, FOR SOLUTION INTRAVENOUS at 10:34

## 2022-12-30 ENCOUNTER — TELEPHONE (OUTPATIENT)
Dept: RADIATION ONCOLOGY | Facility: CLINIC | Age: 82
End: 2022-12-30

## 2022-12-30 NOTE — TELEPHONE ENCOUNTER
Received call from patient wife asking about the denial of a PET and asking what can be done for this.  Redirected wife to radiology team as they have staff doing the insurance verification for those studies.    Wife did have a name of someone that she has been in contact with and will give them a call.

## 2023-01-03 ENCOUNTER — OFFICE VISIT (OUTPATIENT)
Dept: RADIATION ONCOLOGY | Facility: CLINIC | Age: 83
End: 2023-01-03
Attending: UROLOGY
Payer: COMMERCIAL

## 2023-01-03 VITALS
DIASTOLIC BLOOD PRESSURE: 79 MMHG | HEART RATE: 58 BPM | RESPIRATION RATE: 16 BRPM | SYSTOLIC BLOOD PRESSURE: 163 MMHG | OXYGEN SATURATION: 96 %

## 2023-01-03 DIAGNOSIS — C61 MALIGNANT NEOPLASM OF PROSTATE (H): Primary | ICD-10-CM

## 2023-01-03 PROCEDURE — 77263 THER RADIOLOGY TX PLNG CPLX: CPT | Performed by: RADIOLOGY

## 2023-01-03 PROCEDURE — 99215 OFFICE O/P EST HI 40 MIN: CPT | Mod: 25 | Performed by: RADIOLOGY

## 2023-01-03 PROCEDURE — G0463 HOSPITAL OUTPT CLINIC VISIT: HCPCS | Performed by: RADIOLOGY

## 2023-01-03 PROCEDURE — G0463 HOSPITAL OUTPT CLINIC VISIT: HCPCS

## 2023-01-03 ASSESSMENT — PAIN SCALES - GENERAL: PAINLEVEL: NO PAIN (0)

## 2023-01-03 NOTE — PROGRESS NOTES
Oncology Rooming Note    January 3, 2023 10:47 AM   Chet Kathleen is a 82 year old male who presents for:    Chief Complaint   Patient presents with     Oncology Clinic Visit     Prostate Cancer     Follow up with RO after PET PSMA scan     Initial Vitals: BP (!) 163/79   Pulse 58   Resp 16   SpO2 96%  Estimated body mass index is 24.94 kg/m  as calculated from the following:    Height as of 10/6/20: 1.829 m (6').    Weight as of 10/6/20: 83.4 kg (183 lb 14.4 oz). There is no height or weight on file to calculate BSA.  No Pain (0) Comment: Data Unavailable   No LMP for male patient.  Allergies reviewed: Yes  Medications reviewed: Yes    Medications: Medication refills not needed today.  Pharmacy [facility] entered into [facility]: [id number] [address] [place] AT SEC OF [facility]    Clinical concerns: Here for PET PSMA scan results and to decide about treatment course.  Dr. Evans was notified.      Claire Liang RN

## 2023-01-03 NOTE — LETTER
1/3/2023         RE: Chet Kathleen  [address] [place] [address]        Dear Colleague,    Thank you for referring your patient, Chet Kathleen, to the [facility] RADIATION ONCOLOGY [place]. Please see a copy of my visit note below.      [facility] Radiation Oncology Follow Up Note    Patient: Chet Kathleen  MRN: [id number]  Date of Service: 01/03/2023      Mr. Kathleen is a 82 year old male who has been in his usual state of health until recently.  The patient presented with abnormal elevation of PSA from 5.6 in 1/2020 to 7.0 on 2/3/2021 for which he was seeking further evaluation.  Transrectal ultrasound study and biopsy on 4/16/2021 reviewed adenocarcinoma, [place] grade 4+5 = 9, 4+3 = 7 and 3+3 = 6 involving both side of prostate gland.  Patient underwent staging work-up including CT abdomen pelvis and bone scan on 4/28/2021 which showed no evidence of lymph nodes and systemic metastasis.  Patient received robotic assisted radical prostatectomy on 5/27/2021.  The pathology revealed prostatic adenocarcinoma, Delfino grade 4+5 = 9 involving both side of prostate gland with evidence of extraprostatic extension in the right anterior base and the left anterior apex, tumor invasion into left and right seminal vesicle and right vas deferens, and positive margin in the left anterior apex.  There is also evidence of lymphovascular invasion.  4 removed pelvic lymph nodes showed no evidence of metastatic disease.  Patient has been recovering well since surgery.  He has only occasional stress incontinence and wearing a pad as needed.  He is PSA has been undetectable since surgery.  He was find to have detectable PSA to 0.14 on 11/11/2022.  The patient was initially seen and evaluated on 12/7/2022 and was recommended to complete staging work-up including PSMA PET scan.  The PET scan was on 12/28/2022 which showed no evidence of recurrence.  Patient is here today for evaluation and  discussion of treatment recommendation.    CHEMOTHERAPY HISTORY: Concurrent Chemotherapy: No     RADIATION THERAPY HISTORY: Prior Radiation: No     IMPLANTED CARDIAC DEVICE: none     Prostate Specific Antigen Screen   Date Value Ref Range Status   02/03/2021 7.0 (H) 0.0 - 6.5 ng/mL Final   01/06/2020 5.6 0.0 - 6.5 ng/mL Final     PSA Tumor Marker   Date Value Ref Range Status   12/12/2022 0.14 ng/mL Final     Comment:     No reference ranges have been established for patients over 80 years.     Imaging: Imaging results 30 days: PET PSMA Eyes to Thighs    Result Date: 12/28/2022  Combined Report of: PET and CT on 12/28/2022 1:01 PM : 1. PET of the neck, chest, abdomen, and pelvis. 2. PET CT Fusion for Attenuation Correction and Anatomical Localization:  Images were evaluated in axial, coronal, and sagittal planes in bone, soft tissue, and lung windows. 3. 3D MIP and PET-CT fused images were processed on an independent workstation and archived to PACS and reviewed by a radiologist. Technique: 1. PET: The patient received 5.1 mCi of PSMA, body weight was 88.6 kg. Images were evaluated in the axial, sagittal, and coronal planes as well as the rotational whole body MIP. Images were acquired from the Vertex to the Proximal Thighs. 2. CT: Volumetric acquisition for attenuation correction and anatomical localization. INDICATION: Malignant neoplasm of prostate (H) ADDITIONAL INFORMATION OBTAINED FROM EMR: 82 yM with h/o T3b ([place] 4 + 5 = 9) s/p prostatectomy on 5/27/2021 with recent rising PSA to 0.14.  COMPARISON: 1/27/2022 CT, 4/28/2021 CT FINDINGS: Liver SUV= max 7.41, mean 4.22 ,  Aorta SUV: Max 2.87, mean 1.48 HEAD/NECK: There is no suspicious uptake in the neck. CHEST: There is no suspicious PSMA uptake in the chest. Heart size is normal. No pathologically enlarged axillary or mediastinal lymph nodes. Thyroid lobes are normal. ABDOMEN AND PELVIS: There is no suspicious PSMA uptake in the abdomen or pelvis. No  hypoattenuating hepatic lesions. Postsurgical changes of cholecystectomy. Spleen, pancreas, adrenal glands and kidneys are unremarkable. Multiple fluid attenuating renal cysts bilaterally. Prostate is surgically absent. Small and large bowel are normal diameter. No large bowel wall thickening. BONES:  There is no abnormal PSMA uptake in the skeleton     IMPRESSION: 1.  No abnormal PSMA uptake within the visualized head, neck, chest, abdomen or pelvis.    Assessment / Impression     High risk prostate cancer, pathologic stage T3b N0 M0, Delfino grade 4+5 = 9 involving both side of prostate gland and a pretherapy PSA of 7.0.  Patient is status post robotic assisted prostatectomy with pathology showed evidence of seminal vesicle invasion and positive margin in the left anterior apex with 4 negative lymph nodes.  Patient presented with a recent history of rising PSA to 0.14.  Staging PSMA PET scans showed no evidence of recurrence.    Plan:     I have personally reviewed his upcoming medical record today.  This is a 82-year-old gentleman with a diagnosis of high risk prostate cancer status post robotic radical prostatectomy with recent evidence of rising PSA.  Staging PSMA PET scan showed no evidence of recurrence. This is consistent with biochemical recurrence of the prostate cancer.  The possible treatment options including surgery, systemic therapy, and radiation therapy has been discussed with patient in detail and at great lengths.  The possible risks and side effects of radiation therapy has also been explained to the patient.  Questions are answered to patient satisfaction.  I agree the patient a good candidate to considering salvage radiation therapy for his presumably local regional recurrence of the prostate cancer.  The pros and cons of different options has also been discussed with the patient.  After long discussion, the patient elected proceed with hormone therapy (6-24 month) and definitive external beam  radiation therapy as his treatment choice for his prostate cancer being aware of potential risks and side effects involved.  I will schedule patient to get his first 6-month Eligard injection next week.  We will also contact Dr. Martines's office to get fiducials placement for radiation therapy.  The patient is informed to contact radiation oncology and set up time for simulation 1 week after markers placement.  I plan to give him radiation therapy to a total dose of 7000/4400 cGy in 35/22 treatments targeted to the prostate bed/pelvic lymph nodes using IMRT/IGRT.    I spent approximately 40 minutes today with the patient and 80% time was used for counseling.         Deneen Evans MD, PhD  Department of Radiation Oncology   [facility] Radiation Oncology  Tel: [phone]  Page: [phone]    [facility]  [address] [place] [address]     [address] [place] [address]      CC:  Patient Care Team:  Ashlyn Barnes MD as PCP - General (Family Practice)  Deneen Evans MD as MD (Radiation Oncology)  Gabino Martines MD as MD (Urology)  Deneen Evans MD as Assigned Cancer Care Provider      Oncology Rooming Note    January 3, 2023 10:47 AM   Chet Kathleen is a 82 year old male who presents for:    Chief Complaint   Patient presents with     Oncology Clinic Visit     Prostate Cancer     Follow up with RO after PET PSMA scan     Initial Vitals: BP (!) 163/79   Pulse 58   Resp 16   SpO2 96%  Estimated body mass index is 24.94 kg/m  as calculated from the following:    Height as of 10/6/20: 1.829 m (6').    Weight as of 10/6/20: 83.4 kg (183 lb 14.4 oz). There is no height or weight on file to calculate BSA.  No Pain (0) Comment: Data Unavailable   No LMP for male patient.  Allergies reviewed: Yes  Medications reviewed: Yes    Medications: Medication refills not needed today.  Pharmacy name entered into [employer]: [facility] #[id number] [place] [id number] [facility]  AT [address]    Clinical concerns: Here for PET PSMA scan results and to decide about treatment course.  Dr. Evans was notified.      Claire Liang RN                  Again, thank you for allowing me to participate in the care of your patient.        Sincerely,        Deneen Evans MD

## 2023-01-03 NOTE — PROGRESS NOTES
[facility] Radiation Oncology Follow Up Note    Patient: Chet Kathleen  MRN: [id number]  Date of Service: 01/03/2023      Mr. Kathleen is a 82 year old male who has been in his usual state of health until recently.  The patient presented with abnormal elevation of PSA from 5.6 in 1/2020 to 7.0 on 2/3/2021 for which he was seeking further evaluation.  Transrectal ultrasound study and biopsy on 4/16/2021 reviewed adenocarcinoma, Delfino grade 4+5 = 9, 4+3 = 7 and 3+3 = 6 involving both side of prostate gland.  Patient underwent staging work-up including CT abdomen pelvis and bone scan on 4/28/2021 which showed no evidence of lymph nodes and systemic metastasis.  Patient received robotic assisted radical prostatectomy on 5/27/2021.  The pathology revealed prostatic adenocarcinoma, [place] grade 4+5 = 9 involving both side of prostate gland with evidence of extraprostatic extension in the right anterior base and the left anterior apex, tumor invasion into left and right seminal vesicle and right vas deferens, and positive margin in the left anterior apex.  There is also evidence of lymphovascular invasion.  4 removed pelvic lymph nodes showed no evidence of metastatic disease.  Patient has been recovering well since surgery.  He has only occasional stress incontinence and wearing a pad as needed.  He is PSA has been undetectable since surgery.  He was find to have detectable PSA to 0.14 on 11/11/2022.  The patient was initially seen and evaluated on 12/7/2022 and was recommended to complete staging work-up including PSMA PET scan.  The PET scan was on 12/28/2022 which showed no evidence of recurrence.  Patient is here today for evaluation and discussion of treatment recommendation.    CHEMOTHERAPY HISTORY: Concurrent Chemotherapy: No     RADIATION THERAPY HISTORY: Prior Radiation: No     IMPLANTED CARDIAC DEVICE: none     Prostate Specific Antigen Screen   Date Value Ref Range Status   02/03/2021 7.0 (H) 0.0 -  6.5 ng/mL Final   01/06/2020 5.6 0.0 - 6.5 ng/mL Final     PSA Tumor Marker   Date Value Ref Range Status   12/12/2022 0.14 ng/mL Final     Comment:     No reference ranges have been established for patients over 80 years.     Imaging: Imaging results 30 days: PET PSMA Eyes to Thighs    Result Date: 12/28/2022  Combined Report of: PET and CT on 12/28/2022 1:01 PM : 1. PET of the neck, chest, abdomen, and pelvis. 2. PET CT Fusion for Attenuation Correction and Anatomical Localization:  Images were evaluated in axial, coronal, and sagittal planes in bone, soft tissue, and lung windows. 3. 3D MIP and PET-CT fused images were processed on an independent workstation and archived to PACS and reviewed by a radiologist. Technique: 1. PET: The patient received 5.1 mCi of PSMA, body weight was 88.6 kg. Images were evaluated in the axial, sagittal, and coronal planes as well as the rotational whole body MIP. Images were acquired from the Vertex to the Proximal Thighs. 2. CT: Volumetric acquisition for attenuation correction and anatomical localization. INDICATION: Malignant neoplasm of prostate (H) ADDITIONAL INFORMATION OBTAINED FROM EMR: 82 yM with h/o T3b ([place] 4 + 5 = 9) s/p prostatectomy on 5/27/2021 with recent rising PSA to 0.14.  COMPARISON: 1/27/2022 CT, 4/28/2021 CT FINDINGS: Liver SUV= max 7.41, mean 4.22 ,  Aorta SUV: Max 2.87, mean 1.48 HEAD/NECK: There is no suspicious uptake in the neck. CHEST: There is no suspicious PSMA uptake in the chest. Heart size is normal. No pathologically enlarged axillary or mediastinal lymph nodes. Thyroid lobes are normal. ABDOMEN AND PELVIS: There is no suspicious PSMA uptake in the abdomen or pelvis. No hypoattenuating hepatic lesions. Postsurgical changes of cholecystectomy. Spleen, pancreas, adrenal glands and kidneys are unremarkable. Multiple fluid attenuating renal cysts bilaterally. Prostate is surgically absent. Small and large bowel are normal diameter. No large bowel  wall thickening. BONES:  There is no abnormal PSMA uptake in the skeleton     IMPRESSION: 1.  No abnormal PSMA uptake within the visualized head, neck, chest, abdomen or pelvis.    Assessment / Impression     High risk prostate cancer, pathologic stage T3b N0 M0, Delfino grade 4+5 = 9 involving both side of prostate gland and a pretherapy PSA of 7.0.  Patient is status post robotic assisted prostatectomy with pathology showed evidence of seminal vesicle invasion and positive margin in the left anterior apex with 4 negative lymph nodes.  Patient presented with a recent history of rising PSA to 0.14.  Staging PSMA PET scans showed no evidence of recurrence.    Plan:     I have personally reviewed his upcoming medical record today.  This is a 82-year-old gentleman with a diagnosis of high risk prostate cancer status post robotic radical prostatectomy with recent evidence of rising PSA.  Staging PSMA PET scan showed no evidence of recurrence. This is consistent with biochemical recurrence of the prostate cancer.  The possible treatment options including surgery, systemic therapy, and radiation therapy has been discussed with patient in detail and at great lengths.  The possible risks and side effects of radiation therapy has also been explained to the patient.  Questions are answered to patient satisfaction.  I agree the patient a good candidate to considering salvage radiation therapy for his presumably local regional recurrence of the prostate cancer.  The pros and cons of different options has also been discussed with the patient.  After long discussion, the patient elected proceed with hormone therapy (6-24 month) and definitive external beam radiation therapy as his treatment choice for his prostate cancer being aware of potential risks and side effects involved.  I will schedule patient to get his first 6-month Eligard injection next week.  We will also contact Dr. Martines's office to get fiducials placement for  radiation therapy.  The patient is informed to contact radiation oncology and set up time for simulation 1 week after markers placement.  I plan to give him radiation therapy to a total dose of 7000/4400 cGy in 35/22 treatments targeted to the prostate bed/pelvic lymph nodes using IMRT/IGRT.    I spent approximately 40 minutes today with the patient and 80% time was used for counseling.         Deneen Evans MD, PhD  Department of Radiation Oncology   [facility] Radiation Oncology  Tel: [phone]  Page: [phone]    [facility]  [address] [place] [address]     [address] [place] [address]      CC:  Patient Care Team:  Ashlyn Barnes MD as PCP - General (Family Practice)  Deneen Evans MD as MD (Radiation Oncology)  Gabino Martines MD as MD (Urology)  Deneen Evans MD as Assigned Cancer Care Provider

## 2023-01-10 ENCOUNTER — INFUSION THERAPY VISIT (OUTPATIENT)
Dept: INFUSION THERAPY | Facility: CLINIC | Age: 83
End: 2023-01-10
Attending: RADIOLOGY
Payer: COMMERCIAL

## 2023-01-10 VITALS
TEMPERATURE: 97.6 F | HEART RATE: 57 BPM | OXYGEN SATURATION: 97 % | DIASTOLIC BLOOD PRESSURE: 96 MMHG | RESPIRATION RATE: 18 BRPM | SYSTOLIC BLOOD PRESSURE: 159 MMHG

## 2023-01-10 DIAGNOSIS — C61 MALIGNANT NEOPLASM OF PROSTATE (H): Primary | ICD-10-CM

## 2023-01-10 PROCEDURE — 250N000011 HC RX IP 250 OP 636: Performed by: RADIOLOGY

## 2023-01-10 PROCEDURE — 96402 CHEMO HORMON ANTINEOPL SQ/IM: CPT

## 2023-01-10 RX ADMIN — LEUPROLIDE ACETATE 45 MG: KIT at 10:54

## 2023-01-10 NOTE — PROGRESS NOTES
Infusion Nursing Note:  Chet TORRES Alpaannia presents today for first Lupron injection.    Patient seen by provider today: No   present during visit today: Not Applicable.    Note: Chet presents ambulatory to  infusion today for his first Lupron injection. Lupron teaching completed.  Chet given Lupron education handout.  IM Injection to gluteus muscle.     Intravenous Access:  No Intravenous access/labs at this visit.    Treatment Conditions:  Not Applicable.    Post Infusion Assessment:  Patient tolerated injection without incident.     Discharge Plan:   AVS to patient via MYCHART.   Patient discharged in stable condition accompanied by: self.  Departure Mode: Ambulatory.      Tarsha Robertson RN

## 2023-01-21 ENCOUNTER — HEALTH MAINTENANCE LETTER (OUTPATIENT)
Age: 83
End: 2023-01-21

## 2023-01-27 ENCOUNTER — TELEPHONE (OUTPATIENT)
Dept: RADIATION ONCOLOGY | Facility: CLINIC | Age: 83
End: 2023-01-27
Payer: COMMERCIAL

## 2023-01-27 NOTE — TELEPHONE ENCOUNTER
Patient wife called and left voicemail asking for a call back stating they had questions about scheduling.  Called Gurinder and Chet and they stated that Chet would be having his fiducials placed next week Friday 2/3/2023.  Dr. Martines from [facility] will be placing the fiducials and his office wanted to schedule PSA labs thru the rest of 2023.    Patient and wife wondering if it would be better to have those done at the cancer center.  Told patient and wife that they could do either but we would just need to be sure that we could get the results before any follow up visits with Dr. Evans.    Wife also had questions about next steps and when the next PSA would be needed.  Told wife that Chet started his treatment with the hormone injection, next steps would be his fiducial placement and following that procedure we would do the radiation simulation about 5 weeks after the injection date of 1/10/23 per Dr. Evans's simulation orders.    Explained that we would normally not do a repeat PSA until about a month following radiation treatment.    Patient and wife appreciative of information and call back.  Will send a message to radiation scheduling team to reach out about scheduling the radiation simulation as appropriate.

## 2023-02-07 ENCOUNTER — TELEPHONE (OUTPATIENT)
Dept: RADIATION ONCOLOGY | Facility: CLINIC | Age: 83
End: 2023-02-07
Payer: COMMERCIAL

## 2023-02-07 NOTE — TELEPHONE ENCOUNTER
Received voicemail from patient and wife asking for a call back as they have questions about his visit in the infusion center.    Called patient and wife back and they have questions about the billing for his Lupron injection given at the [facility].  Patient and wife had already called and spoken to the billing office.  Told them I would have no knowledge about the billing or their itemized bill for services.  Asked if they had asked the billing office to give them the break down of the bill and they did not.  Directed them to call the insurance company and ask if they could give them a break down of the billing line items to answer their questions.  They plan to call the insurance company and if they have more questions may call back to speak with someone that is connected with the infusion center.

## 2023-02-16 ENCOUNTER — ALLIED HEALTH/NURSE VISIT (OUTPATIENT)
Dept: RADIATION ONCOLOGY | Facility: HOSPITAL | Age: 83
End: 2023-02-16
Attending: RADIOLOGY
Payer: COMMERCIAL

## 2023-02-16 NOTE — PROCEDURES
Clinical Treatment Planning Note    The complex radiotherapy planning will be completed for the patient for his prostate cancer.  The patient had a planning CT earlier today for planning.  The treatment aids were used for planning, including vac-loc immobilization.  The therapy planning is necessary to reduce radiotherapy dose to the normal critical structures which is not possible with simple treatment planning.  In addition, dose to the target and the critical structures will require three-dimensional analysis of the isodose distribution.  This planning will be done to reduce dose to rectum, small bowel, and the urinary bladder, penile bulb and femoral heads.     I will contour the clinical tumor volume  CTV , with extended volume of planning treatment volume  PTV  on the treatment planning system.  The critical structures will be outlined, including urinary to rectum, small bowel, and urinary bladder, penile bulb, femoral heads, bones, and soft tissue.     The treatment planning will be done on the computer treatment planning system.  I will consider to use multiple gantry positions to achieve optimal PTV coverage.  The dose distribution to the above critical structures will be reviewed.  The isodose distribution along the X, Y, Z plan will be also reviewed.  Custom blocking will be used to shield normal structures.  The beam s eye view will be reviewed and the digital reconstructed imaging will be reviewed on the planning software.  The IMRT/IGRT technique will be used to deliver optimal dose to the tumor and to protect normal tissue.  The patient will receive a total dose of  a total dose of 7000/4400 cGy in 35/22 treatments targeted to the prostate bed/pelvic lymph nodes using IMRT/IGRT.        Deneen Evans MD, PhD  Department of Radiation Oncology   [facility] Radiation Oncology  Tel: [phone]  Page: [phone]    [facility]  [address] [place], [place] [address] [place] [address]  [address]  [place] [address]

## 2023-02-16 NOTE — PROCEDURES
SIMULATION NOTE:    DIAGNOSIS:  High risk prostate cancer, pathologic stage T3b N0 M0, [place] grade 4+5 = 9 involving both side of prostate gland and a pretherapy PSA of 7.0.  Patient is status post robotic assisted prostatectomy with pathology showed evidence of seminal vesicle invasion and positive margin in the left anterior apex with 4 negative lymph nodes.  Patient presented with a recent history of rising PSA to 0.14.  Staging PSMA PET scans showed no evidence of recurrence.    INDICATION:  After discussing with the patient about various treatment options, the patient elected to proceed with 6 months to 2 years hormone therapy (1st 6 months Eligard was given in 1/2023) and external beam radiation therapy as his treatment of choice for his prostate cancer.    CONSENT:  The possible risks and side effects of radiation therapy have been discussed with the patient in detail and at a great length.  Questions are answered to patient's satisfaction.  The written consent was obtained.    SIMULATION:  The patient is in a supine position with a headrest and a vac loc between the bilateral lower extremities to help keep the same position during the daily radiation therapy.  Tentative isocenter is set up in the center of the pelvic region.  We will acquire CT information to help us to better locate the target and design the radiation therapy field.    BLOCKS:  Custom blocks will be drawn to minimize radiation to normal tissues and to protect normal organs including, but not limited to, urinary bladder, rectum, small bowels, bone and soft tissue.    DOSAGE:  I plan to give him radiation therapy a total dose of 7000/4400 cGy in 35/22 treatments targeted to the prostate bed/pelvic lymph nodes using IMRT/IGRT. I will consider to use IMRT/IGRT technique to help us to better locate the target and to protect normal tissues.      Deneen Evans MD, PhD  Department of Radiation Oncology   [facility] Radiation Oncology  Tel:  [phone]  Page: [phone]    [facility]  [address] [place] [address]     [place] [id number] [facility]   [place] [address]

## 2023-02-27 ENCOUNTER — APPOINTMENT (OUTPATIENT)
Dept: RADIATION ONCOLOGY | Facility: CLINIC | Age: 83
End: 2023-02-27
Attending: RADIOLOGY
Payer: COMMERCIAL

## 2023-02-27 PROCEDURE — 77338 DESIGN MLC DEVICE FOR IMRT: CPT | Mod: 26 | Performed by: RADIOLOGY

## 2023-02-27 PROCEDURE — 77301 RADIOTHERAPY DOSE PLAN IMRT: CPT | Performed by: RADIOLOGY

## 2023-02-27 PROCEDURE — 77338 DESIGN MLC DEVICE FOR IMRT: CPT | Performed by: RADIOLOGY

## 2023-02-27 PROCEDURE — 77300 RADIATION THERAPY DOSE PLAN: CPT | Mod: 26 | Performed by: RADIOLOGY

## 2023-02-27 PROCEDURE — 77301 RADIOTHERAPY DOSE PLAN IMRT: CPT | Mod: 26 | Performed by: RADIOLOGY

## 2023-02-27 PROCEDURE — 77300 RADIATION THERAPY DOSE PLAN: CPT | Performed by: RADIOLOGY

## 2023-03-01 ENCOUNTER — APPOINTMENT (OUTPATIENT)
Dept: RADIATION ONCOLOGY | Facility: CLINIC | Age: 83
End: 2023-03-01
Attending: RADIOLOGY
Payer: COMMERCIAL

## 2023-03-01 VITALS
OXYGEN SATURATION: 96 % | SYSTOLIC BLOOD PRESSURE: 148 MMHG | WEIGHT: 198.5 LBS | TEMPERATURE: 97.7 F | HEART RATE: 66 BPM | DIASTOLIC BLOOD PRESSURE: 80 MMHG | BODY MASS INDEX: 26.92 KG/M2 | RESPIRATION RATE: 16 BRPM

## 2023-03-01 DIAGNOSIS — C61 MALIGNANT NEOPLASM OF PROSTATE (H): Primary | ICD-10-CM

## 2023-03-01 PROCEDURE — 77385 HC IMRT TREATMENT DELIVERY, SIMPLE: CPT | Performed by: RADIOLOGY

## 2023-03-01 PROCEDURE — 77014 PR CT GUIDE FOR PLACEMENT RADIATION THERAPY FIELDS: CPT | Mod: 26 | Performed by: RADIOLOGY

## 2023-03-01 NOTE — LETTER
3/1/2023         RE: Chet Kathleen  [address]  [place] [address]        Dear Colleague,    Thank you for referring your patient, Chet Kathleen, to the [facility] RADIATION ONCOLOGY [place]. Please see a copy of my visit note below.      RADIATION ONCOLOGY WEEKLY TREATMENT VISIT NOTE      Assessment / Impression     Malignant neoplasm of prostate (H) [C61]    Tolerating radiation therapy well.  All questions and concerns addressed.    Plan:     Continue radiation treatment as prescribed.    Discussed with patient about a bowel and bladder protocol during the therapy.    Subjective:      HPI: Chet Kathleen is a 82 year old male with  Malignant neoplasm of prostate (H) [C61]    The following portions of the patient's history were reviewed and updated as appropriate: allergies, current medications, past family history, past medical history, past social history, past surgical history and problem list.    Assessment                  Body Site:  Pelvis  Stereotactic Radiosurgery: No  Concurrent Therapy: Yes (leuprolide given 23)  Today's Dose: 200  Total Dose for Pelvis: 7000  Today's Fraction/Total Fraction Pelvis:   Drainage: 0: Absent  Diarrhea W/O Colostomy: 0: None  Constipation: 1: Requiring stool softner or dietary modifation  Proctitis: 0: None  Urinary frequency and/or urgency: 1: Increase in frequency or nocturia up to two times normal  Urinary Retention: 0 : Normal  Urinary Incontinence: 1: Stress incontinence (stress incontinence)  Dysuria: 0: None  Nocturia: 2  Urine Color Change: 0: Normal                                     Sexuality Alteration                    Emotional Alteration    Copin: Effective  Comfort Alteration   KPS: 100 % Normal, no complaints  Fatigue (ONS scale): 0: No Fatigue  Pain Location: denies   Nutrition Alteration   Anorexia: 0: None  Nausea: 0: None  Vomitin: None  Weight: 90 kg (198 lb 8 oz)  Skin Alteration   Skin  Sensation: 0: No problem  Skin Reaction: 0: None  AUA Assessment                                           Accompanied by       Objective:     Exam: Examination reviewed no significant changes.    Vitals:    03/01/23 0945   BP: (!) 148/80   Pulse: 66   Resp: 16   Temp: 97.7  F (36.5  C)   TempSrc: Oral   SpO2: 96%   Weight: 90 kg (198 lb 8 oz)       Wt Readings from Last 8 Encounters:   03/01/23 90 kg (198 lb 8 oz)   10/06/20 83.4 kg (183 lb 14.4 oz)   08/06/13 95.5 kg (210 lb 9.6 oz)       General: Alert and oriented, in no acute distress  Chet has no Erythema.  Aria chart and setup information reviewed    Deneen Evans MD        Again, thank you for allowing me to participate in the care of your patient.        Sincerely,        Deneen Evans MD

## 2023-03-01 NOTE — PROGRESS NOTES
RADIATION ONCOLOGY WEEKLY TREATMENT VISIT NOTE      Assessment / Impression     Malignant neoplasm of prostate (H) [C61]    Tolerating radiation therapy well.  All questions and concerns addressed.    Plan:     Continue radiation treatment as prescribed.    Discussed with patient about a bowel and bladder protocol during the therapy.    Subjective:      HPI: Chet Kathleen is a 82 year old male with  Malignant neoplasm of prostate (H) [C61]    The following portions of the patient's history were reviewed and updated as appropriate: allergies, current medications, past family history, past medical history, past social history, past surgical history and problem list.    Assessment                  Body Site:  Pelvis  Stereotactic Radiosurgery: No  Concurrent Therapy: Yes (leuprolide given 23)  Today's Dose: 200  Total Dose for Pelvis: 7000  Today's Fraction/Total Fraction Pelvis:   Drainage: 0: Absent  Diarrhea W/O Colostomy: 0: None  Constipation: 1: Requiring stool softner or dietary modifation  Proctitis: 0: None  Urinary frequency and/or urgency: 1: Increase in frequency or nocturia up to two times normal  Urinary Retention: 0 : Normal  Urinary Incontinence: 1: Stress incontinence (stress incontinence)  Dysuria: 0: None  Nocturia: 2  Urine Color Change: 0: Normal                                     Sexuality Alteration                    Emotional Alteration    Copin: Effective  Comfort Alteration   KPS: 100 % Normal, no complaints  Fatigue (ONS scale): 0: No Fatigue  Pain Location: denies   Nutrition Alteration   Anorexia: 0: None  Nausea: 0: None  Vomitin: None  Weight: 90 kg (198 lb 8 oz)  Skin Alteration   Skin Sensation: 0: No problem  Skin Reaction: 0: None  AUA Assessment                                           Accompanied by       Objective:     Exam: Examination reviewed no significant changes.    Vitals:    23 0945   BP: (!) 148/80   Pulse: 66   Resp: 16   Temp: 97.7   F (36.5  C)   TempSrc: Oral   SpO2: 96%   Weight: 90 kg (198 lb 8 oz)       Wt Readings from Last 8 Encounters:   03/01/23 90 kg (198 lb 8 oz)   10/06/20 83.4 kg (183 lb 14.4 oz)   08/06/13 95.5 kg (210 lb 9.6 oz)       General: Alert and oriented, in no acute distress  Chet has no Erythema.  Aria chart and setup information reviewed    Deneen Evans MD

## 2023-03-02 ENCOUNTER — APPOINTMENT (OUTPATIENT)
Dept: RADIATION ONCOLOGY | Facility: CLINIC | Age: 83
End: 2023-03-02
Attending: RADIOLOGY
Payer: COMMERCIAL

## 2023-03-02 PROCEDURE — 77014 PR CT GUIDE FOR PLACEMENT RADIATION THERAPY FIELDS: CPT | Mod: 26 | Performed by: RADIOLOGY

## 2023-03-02 PROCEDURE — 77385 HC IMRT TREATMENT DELIVERY, SIMPLE: CPT | Performed by: STUDENT IN AN ORGANIZED HEALTH CARE EDUCATION/TRAINING PROGRAM

## 2023-03-03 ENCOUNTER — APPOINTMENT (OUTPATIENT)
Dept: RADIATION ONCOLOGY | Facility: CLINIC | Age: 83
End: 2023-03-03
Attending: RADIOLOGY
Payer: COMMERCIAL

## 2023-03-03 PROCEDURE — 77385 HC IMRT TREATMENT DELIVERY, SIMPLE: CPT | Performed by: STUDENT IN AN ORGANIZED HEALTH CARE EDUCATION/TRAINING PROGRAM

## 2023-03-03 PROCEDURE — 77014 PR CT GUIDE FOR PLACEMENT RADIATION THERAPY FIELDS: CPT | Mod: 26 | Performed by: RADIOLOGY

## 2023-03-06 ENCOUNTER — APPOINTMENT (OUTPATIENT)
Dept: RADIATION ONCOLOGY | Facility: CLINIC | Age: 83
End: 2023-03-06
Attending: RADIOLOGY
Payer: COMMERCIAL

## 2023-03-06 PROCEDURE — 77385 HC IMRT TREATMENT DELIVERY, SIMPLE: CPT | Performed by: STUDENT IN AN ORGANIZED HEALTH CARE EDUCATION/TRAINING PROGRAM

## 2023-03-06 PROCEDURE — 77014 PR CT GUIDE FOR PLACEMENT RADIATION THERAPY FIELDS: CPT | Mod: 26 | Performed by: RADIOLOGY

## 2023-03-07 ENCOUNTER — APPOINTMENT (OUTPATIENT)
Dept: RADIATION ONCOLOGY | Facility: CLINIC | Age: 83
End: 2023-03-07
Attending: RADIOLOGY
Payer: COMMERCIAL

## 2023-03-07 PROCEDURE — 77385 HC IMRT TREATMENT DELIVERY, SIMPLE: CPT | Performed by: STUDENT IN AN ORGANIZED HEALTH CARE EDUCATION/TRAINING PROGRAM

## 2023-03-07 PROCEDURE — 77427 RADIATION TX MANAGEMENT X5: CPT | Performed by: RADIOLOGY

## 2023-03-07 PROCEDURE — 77336 RADIATION PHYSICS CONSULT: CPT | Performed by: RADIOLOGY

## 2023-03-07 PROCEDURE — 77014 PR CT GUIDE FOR PLACEMENT RADIATION THERAPY FIELDS: CPT | Mod: 26 | Performed by: RADIOLOGY

## 2023-03-08 ENCOUNTER — APPOINTMENT (OUTPATIENT)
Dept: RADIATION ONCOLOGY | Facility: CLINIC | Age: 83
End: 2023-03-08
Attending: RADIOLOGY
Payer: COMMERCIAL

## 2023-03-08 VITALS
BODY MASS INDEX: 26.99 KG/M2 | SYSTOLIC BLOOD PRESSURE: 145 MMHG | OXYGEN SATURATION: 97 % | WEIGHT: 199 LBS | RESPIRATION RATE: 16 BRPM | DIASTOLIC BLOOD PRESSURE: 72 MMHG | HEART RATE: 69 BPM | TEMPERATURE: 97.9 F

## 2023-03-08 DIAGNOSIS — C61 MALIGNANT NEOPLASM OF PROSTATE (H): Primary | ICD-10-CM

## 2023-03-08 PROCEDURE — 77385 HC IMRT TREATMENT DELIVERY, SIMPLE: CPT | Performed by: RADIOLOGY

## 2023-03-08 PROCEDURE — 77014 PR CT GUIDE FOR PLACEMENT RADIATION THERAPY FIELDS: CPT | Mod: 26 | Performed by: RADIOLOGY

## 2023-03-08 NOTE — LETTER
3/8/2023         RE: Chet Kathleen  [address]  [place] [address]        Dear Colleague,    Thank you for referring your patient, Chet Kathleen, to the [facility] RADIATION ONCOLOGY [place]. Please see a copy of my visit note below.      RADIATION ONCOLOGY WEEKLY TREATMENT VISIT NOTE      Assessment / Impression     Malignant neoplasm of prostate (H) [C61]     Tolerating radiation therapy well.  All questions and concerns addressed.    Plan:     Continue radiation treatment as prescribed.    Subjective:      HPI: Chet Kathleen is a 82 year old male with  Malignant neoplasm of prostate (H) [C61]    The following portions of the patient's history were reviewed and updated as appropriate: allergies, current medications, past family history, past medical history, past social history, past surgical history and problem list.    Assessment                  Body Site:  Pelvis  Stereotactic Radiosurgery: No  Concurrent Therapy: Yes (leuprolide given 23)  Today's Dose: 1200  Total Dose for Pelvis: 7000  Today's Fraction/Total Fraction Pelvis:   Drainage: 0: Absent  Diarrhea W/O Colostomy: 1: Increase of less than 4 stools/day over pretreatment  Constipation: 0: None  Proctitis: 0: None  Urinary frequency and/or urgency: 2: Increase more than two times normul but less than hourly  Urinary Retention: 0 : Normal  Urinary Incontinence: 1: Stress incontinence (stress incontinence)  Dysuria: 0: None  Nocturia: 2  Urine Color Change: 0: Normal                                     Sexuality Alteration                    Emotional Alteration    Copin: Effective  Comfort Alteration   KPS: 90% Can perform normal activity, minor signs of disease  Fatigue (ONS scale): 3: Mild Fatigue  Pain Location: denies   Nutrition Alteration   Anorexia: 0: None  Nausea: 0: None  Vomitin: None  Weight: 90.3 kg (199 lb)  Skin Alteration   Skin Sensation: 0: No problem  Skin Reaction: 0: None  AUA  Assessment                                           Accompanied by       Objective:     Exam: Examination reviewed no significant changes.    Vitals:    03/08/23 1053   BP: (!) 145/72   Pulse: 69   Resp: 16   Temp: 97.9  F (36.6  C)   TempSrc: Oral   SpO2: 97%   Weight: 90.3 kg (199 lb)       Wt Readings from Last 8 Encounters:   03/08/23 90.3 kg (199 lb)   03/01/23 90 kg (198 lb 8 oz)   10/06/20 83.4 kg (183 lb 14.4 oz)   08/06/13 95.5 kg (210 lb 9.6 oz)       General: Alert and oriented, in no acute distress  Chet has no Erythema.  Aria chart and setup information reviewed    Deneen Evans MD        Again, thank you for allowing me to participate in the care of your patient.        Sincerely,        Deneen Evans MD

## 2023-03-08 NOTE — PROGRESS NOTES
RADIATION ONCOLOGY WEEKLY TREATMENT VISIT NOTE      Assessment / Impression     Malignant neoplasm of prostate (H) [C61]     Tolerating radiation therapy well.  All questions and concerns addressed.    Plan:     Continue radiation treatment as prescribed.    Subjective:      HPI: Chet Kathleen is a 82 year old male with  Malignant neoplasm of prostate (H) [C61]    The following portions of the patient's history were reviewed and updated as appropriate: allergies, current medications, past family history, past medical history, past social history, past surgical history and problem list.    Assessment                  Body Site:  Pelvis  Stereotactic Radiosurgery: No  Concurrent Therapy: Yes (leuprolide given 23)  Today's Dose: 1200  Total Dose for Pelvis: 7000  Today's Fraction/Total Fraction Pelvis:   Drainage: 0: Absent  Diarrhea W/O Colostomy: 1: Increase of less than 4 stools/day over pretreatment  Constipation: 0: None  Proctitis: 0: None  Urinary frequency and/or urgency: 2: Increase more than two times normul but less than hourly  Urinary Retention: 0 : Normal  Urinary Incontinence: 1: Stress incontinence (stress incontinence)  Dysuria: 0: None  Nocturia: 2  Urine Color Change: 0: Normal                                     Sexuality Alteration                    Emotional Alteration    Copin: Effective  Comfort Alteration   KPS: 90% Can perform normal activity, minor signs of disease  Fatigue (ONS scale): 3: Mild Fatigue  Pain Location: denies   Nutrition Alteration   Anorexia: 0: None  Nausea: 0: None  Vomitin: None  Weight: 90.3 kg (199 lb)  Skin Alteration   Skin Sensation: 0: No problem  Skin Reaction: 0: None  AUA Assessment                                           Accompanied by       Objective:     Exam: Examination reviewed no significant changes.    Vitals:    23 1053   BP: (!) 145/72   Pulse: 69   Resp: 16   Temp: 97.9  F (36.6  C)   TempSrc: Oral   SpO2: 97%    Weight: 90.3 kg (199 lb)       Wt Readings from Last 8 Encounters:   03/08/23 90.3 kg (199 lb)   03/01/23 90 kg (198 lb 8 oz)   10/06/20 83.4 kg (183 lb 14.4 oz)   08/06/13 95.5 kg (210 lb 9.6 oz)       General: Alert and oriented, in no acute distress  Chte has no Erythema.  Aria chart and setup information reviewed    Deneen Evans MD

## 2023-03-09 ENCOUNTER — APPOINTMENT (OUTPATIENT)
Dept: RADIATION ONCOLOGY | Facility: CLINIC | Age: 83
End: 2023-03-09
Attending: RADIOLOGY
Payer: COMMERCIAL

## 2023-03-09 PROCEDURE — 77014 PR CT GUIDE FOR PLACEMENT RADIATION THERAPY FIELDS: CPT | Mod: 26 | Performed by: STUDENT IN AN ORGANIZED HEALTH CARE EDUCATION/TRAINING PROGRAM

## 2023-03-09 PROCEDURE — 77385 HC IMRT TREATMENT DELIVERY, SIMPLE: CPT | Performed by: STUDENT IN AN ORGANIZED HEALTH CARE EDUCATION/TRAINING PROGRAM

## 2023-03-10 ENCOUNTER — APPOINTMENT (OUTPATIENT)
Dept: RADIATION ONCOLOGY | Facility: CLINIC | Age: 83
End: 2023-03-10
Attending: RADIOLOGY
Payer: COMMERCIAL

## 2023-03-13 ENCOUNTER — APPOINTMENT (OUTPATIENT)
Dept: RADIATION ONCOLOGY | Facility: CLINIC | Age: 83
End: 2023-03-13
Attending: RADIOLOGY
Payer: COMMERCIAL

## 2023-03-13 PROCEDURE — 77014 PR CT GUIDE FOR PLACEMENT RADIATION THERAPY FIELDS: CPT | Mod: 26 | Performed by: RADIOLOGY

## 2023-03-13 PROCEDURE — 77385 HC IMRT TREATMENT DELIVERY, SIMPLE: CPT | Performed by: STUDENT IN AN ORGANIZED HEALTH CARE EDUCATION/TRAINING PROGRAM

## 2023-03-14 ENCOUNTER — APPOINTMENT (OUTPATIENT)
Dept: RADIATION ONCOLOGY | Facility: CLINIC | Age: 83
End: 2023-03-14
Attending: RADIOLOGY
Payer: COMMERCIAL

## 2023-03-14 PROCEDURE — 77385 HC IMRT TREATMENT DELIVERY, SIMPLE: CPT | Performed by: STUDENT IN AN ORGANIZED HEALTH CARE EDUCATION/TRAINING PROGRAM

## 2023-03-14 PROCEDURE — 77427 RADIATION TX MANAGEMENT X5: CPT | Performed by: RADIOLOGY

## 2023-03-14 PROCEDURE — 77336 RADIATION PHYSICS CONSULT: CPT | Performed by: RADIOLOGY

## 2023-03-14 PROCEDURE — 77014 PR CT GUIDE FOR PLACEMENT RADIATION THERAPY FIELDS: CPT | Mod: 26 | Performed by: RADIOLOGY

## 2023-03-15 ENCOUNTER — APPOINTMENT (OUTPATIENT)
Dept: RADIATION ONCOLOGY | Facility: CLINIC | Age: 83
End: 2023-03-15
Attending: RADIOLOGY
Payer: COMMERCIAL

## 2023-03-15 VITALS
OXYGEN SATURATION: 98 % | SYSTOLIC BLOOD PRESSURE: 145 MMHG | WEIGHT: 200.2 LBS | TEMPERATURE: 98 F | DIASTOLIC BLOOD PRESSURE: 70 MMHG | BODY MASS INDEX: 27.15 KG/M2 | HEART RATE: 64 BPM | RESPIRATION RATE: 18 BRPM

## 2023-03-15 DIAGNOSIS — C61 MALIGNANT NEOPLASM OF PROSTATE (H): Primary | ICD-10-CM

## 2023-03-15 PROCEDURE — 77014 PR CT GUIDE FOR PLACEMENT RADIATION THERAPY FIELDS: CPT | Mod: 26 | Performed by: RADIOLOGY

## 2023-03-15 PROCEDURE — 77385 HC IMRT TREATMENT DELIVERY, SIMPLE: CPT | Performed by: RADIOLOGY

## 2023-03-15 ASSESSMENT — PAIN SCALES - GENERAL: PAINLEVEL: NO PAIN (0)

## 2023-03-15 NOTE — LETTER
3/15/2023         RE: Chet Kathleen  [address]  [place] [address]        Dear Colleague,    Thank you for referring your patient, Chet Kathleen, to the [facility] RADIATION ONCOLOGY [place]. Please see a copy of my visit note below.      RADIATION ONCOLOGY WEEKLY TREATMENT VISIT NOTE      Assessment / Impression     Malignant neoplasm of prostate (H) [C61]    Tolerating radiation therapy well.  All questions and concerns addressed.    Plan:     Continue radiation treatment as prescribed.    Discussed with the patient about bowel and bladder protocol during the therapy.    Subjective:      HPI: Chet Kathleen is a 82 year old male with  Malignant neoplasm of prostate (H) [C61]    The following portions of the patient's history were reviewed and updated as appropriate: allergies, current medications, past family history, past medical history, past social history, past surgical history and problem list.    Assessment                  Body Site:  Pelvis  Stereotactic Radiosurgery: No  Concurrent Therapy: Yes (leuprolide given 23)  Today's Dose: 2200  Total Dose for Pelvis: 7000  Today's Fraction/Total Fraction Pelvis:   Drainage: 0: Absent  Diarrhea W/O Colostomy: 1: Increase of less than 4 stools/day over pretreatment  Constipation: 0: None  Proctitis: 0: None  Urinary frequency and/or urgency: 2: Increase more than two times normul but less than hourly  Urinary Retention: 0 : Normal  Urinary Incontinence: 0: None  Dysuria: 0: None  Nocturia: 3-4  Urine Color Change: 0: Normal                                     Sexuality Alteration    Change in sexuality: 0: Absent               Emotional Alteration    Copin: Effective  Comfort Alteration   KPS: 90% Can perform normal activity, minor signs of disease  Fatigue (ONS scale): 4: Moderate Fatigue  Pain Location: denies   Nutrition Alteration   Anorexia: 0: None  Nausea: 0: None  Vomitin: None  Weight: 90.8 kg (200  lb 3.2 oz)  Skin Alteration   Skin Sensation: 0: No problem  Skin Reaction: 0: None  AUA Assessment                                           Accompanied by       Objective:     Exam: no Erythema.    Vitals:    03/15/23 1116   BP: (!) 145/70   Pulse: 64   Resp: 18   Temp: 98  F (36.7  C)   SpO2: 98%   Weight: 90.8 kg (200 lb 3.2 oz)   PainSc: No Pain (0)       Wt Readings from Last 8 Encounters:   03/15/23 90.8 kg (200 lb 3.2 oz)   03/08/23 90.3 kg (199 lb)   03/01/23 90 kg (198 lb 8 oz)   10/06/20 83.4 kg (183 lb 14.4 oz)   08/06/13 95.5 kg (210 lb 9.6 oz)       General: Alert and oriented, in no acute distress  Chet has no Erythema.  Aria chart and setup information reviewed    Deneen Evans MD        Again, thank you for allowing me to participate in the care of your patient.        Sincerely,        Deneen Evans MD

## 2023-03-15 NOTE — PROGRESS NOTES
RADIATION ONCOLOGY WEEKLY TREATMENT VISIT NOTE      Assessment / Impression     Malignant neoplasm of prostate (H) [C61]    Tolerating radiation therapy well.  All questions and concerns addressed.    Plan:     Continue radiation treatment as prescribed.    Discussed with the patient about bowel and bladder protocol during the therapy.    Subjective:      HPI: Chet Kathleen is a 82 year old male with  Malignant neoplasm of prostate (H) [C61]    The following portions of the patient's history were reviewed and updated as appropriate: allergies, current medications, past family history, past medical history, past social history, past surgical history and problem list.    Assessment                  Body Site:  Pelvis  Stereotactic Radiosurgery: No  Concurrent Therapy: Yes (leuprolide given 23)  Today's Dose: 2200  Total Dose for Pelvis: 7000  Today's Fraction/Total Fraction Pelvis:   Drainage: 0: Absent  Diarrhea W/O Colostomy: 1: Increase of less than 4 stools/day over pretreatment  Constipation: 0: None  Proctitis: 0: None  Urinary frequency and/or urgency: 2: Increase more than two times normul but less than hourly  Urinary Retention: 0 : Normal  Urinary Incontinence: 0: None  Dysuria: 0: None  Nocturia: 3-4  Urine Color Change: 0: Normal                                     Sexuality Alteration    Change in sexuality: 0: Absent               Emotional Alteration    Copin: Effective  Comfort Alteration   KPS: 90% Can perform normal activity, minor signs of disease  Fatigue (ONS scale): 4: Moderate Fatigue  Pain Location: denies   Nutrition Alteration   Anorexia: 0: None  Nausea: 0: None  Vomitin: None  Weight: 90.8 kg (200 lb 3.2 oz)  Skin Alteration   Skin Sensation: 0: No problem  Skin Reaction: 0: None  AUA Assessment                                           Accompanied by       Objective:     Exam: no Erythema.    Vitals:    03/15/23 1116   BP: (!) 145/70   Pulse: 64   Resp: 18    Temp: 98  F (36.7  C)   SpO2: 98%   Weight: 90.8 kg (200 lb 3.2 oz)   PainSc: No Pain (0)       Wt Readings from Last 8 Encounters:   03/15/23 90.8 kg (200 lb 3.2 oz)   03/08/23 90.3 kg (199 lb)   03/01/23 90 kg (198 lb 8 oz)   10/06/20 83.4 kg (183 lb 14.4 oz)   08/06/13 95.5 kg (210 lb 9.6 oz)       General: Alert and oriented, in no acute distress  Chet has no Erythema.  Aria chart and setup information reviewed    Deneen Evans MD

## 2023-03-16 ENCOUNTER — APPOINTMENT (OUTPATIENT)
Dept: RADIATION ONCOLOGY | Facility: CLINIC | Age: 83
End: 2023-03-16
Attending: RADIOLOGY
Payer: COMMERCIAL

## 2023-03-16 PROCEDURE — 77385 HC IMRT TREATMENT DELIVERY, SIMPLE: CPT | Performed by: STUDENT IN AN ORGANIZED HEALTH CARE EDUCATION/TRAINING PROGRAM

## 2023-03-16 PROCEDURE — 77014 PR CT GUIDE FOR PLACEMENT RADIATION THERAPY FIELDS: CPT | Mod: 26 | Performed by: RADIOLOGY

## 2023-03-17 ENCOUNTER — APPOINTMENT (OUTPATIENT)
Dept: RADIATION ONCOLOGY | Facility: CLINIC | Age: 83
End: 2023-03-17
Attending: RADIOLOGY
Payer: COMMERCIAL

## 2023-03-17 PROCEDURE — 77385 HC IMRT TREATMENT DELIVERY, SIMPLE: CPT | Performed by: STUDENT IN AN ORGANIZED HEALTH CARE EDUCATION/TRAINING PROGRAM

## 2023-03-17 PROCEDURE — 77014 PR CT GUIDE FOR PLACEMENT RADIATION THERAPY FIELDS: CPT | Mod: 26 | Performed by: STUDENT IN AN ORGANIZED HEALTH CARE EDUCATION/TRAINING PROGRAM

## 2023-03-20 ENCOUNTER — APPOINTMENT (OUTPATIENT)
Dept: RADIATION ONCOLOGY | Facility: CLINIC | Age: 83
End: 2023-03-20
Attending: RADIOLOGY
Payer: COMMERCIAL

## 2023-03-20 PROCEDURE — 77385 HC IMRT TREATMENT DELIVERY, SIMPLE: CPT | Performed by: RADIOLOGY

## 2023-03-20 PROCEDURE — 77014 PR CT GUIDE FOR PLACEMENT RADIATION THERAPY FIELDS: CPT | Mod: 26 | Performed by: RADIOLOGY

## 2023-03-21 ENCOUNTER — APPOINTMENT (OUTPATIENT)
Dept: RADIATION ONCOLOGY | Facility: CLINIC | Age: 83
End: 2023-03-21
Attending: RADIOLOGY
Payer: COMMERCIAL

## 2023-03-21 PROCEDURE — 77385 HC IMRT TREATMENT DELIVERY, SIMPLE: CPT | Performed by: RADIOLOGY

## 2023-03-21 PROCEDURE — 77014 PR CT GUIDE FOR PLACEMENT RADIATION THERAPY FIELDS: CPT | Mod: 26 | Performed by: RADIOLOGY

## 2023-03-21 PROCEDURE — 77427 RADIATION TX MANAGEMENT X5: CPT | Performed by: RADIOLOGY

## 2023-03-21 PROCEDURE — 77336 RADIATION PHYSICS CONSULT: CPT | Performed by: RADIOLOGY

## 2023-03-22 ENCOUNTER — APPOINTMENT (OUTPATIENT)
Dept: RADIATION ONCOLOGY | Facility: CLINIC | Age: 83
End: 2023-03-22
Attending: RADIOLOGY
Payer: COMMERCIAL

## 2023-03-22 VITALS
SYSTOLIC BLOOD PRESSURE: 137 MMHG | BODY MASS INDEX: 27.48 KG/M2 | RESPIRATION RATE: 16 BRPM | DIASTOLIC BLOOD PRESSURE: 74 MMHG | OXYGEN SATURATION: 96 % | WEIGHT: 202.6 LBS | TEMPERATURE: 97.6 F | HEART RATE: 67 BPM

## 2023-03-22 DIAGNOSIS — C61 MALIGNANT NEOPLASM OF PROSTATE (H): Primary | ICD-10-CM

## 2023-03-22 PROCEDURE — 77014 PR CT GUIDE FOR PLACEMENT RADIATION THERAPY FIELDS: CPT | Mod: 26 | Performed by: RADIOLOGY

## 2023-03-22 PROCEDURE — 77385 HC IMRT TREATMENT DELIVERY, SIMPLE: CPT | Performed by: RADIOLOGY

## 2023-03-22 RX ORDER — TAMSULOSIN HYDROCHLORIDE 0.4 MG/1
0.4 CAPSULE ORAL DAILY
Qty: 30 CAPSULE | Refills: 1 | Status: SHIPPED | OUTPATIENT
Start: 2023-03-22

## 2023-03-22 NOTE — PROGRESS NOTES
RADIATION ONCOLOGY WEEKLY TREATMENT VISIT NOTE      Assessment / Impression     Malignant neoplasm of prostate (H) [C61]    Tolerating radiation therapy well.  All questions and concerns addressed.    Plan:     Continue radiation treatment as prescribed.    Patient has a newly onset of loose bowel and has been taking over-the-counter Imodium A-D with good control.    He also noticed increased urination and nocturia 4 times per night.  Denies any burning sensations and urgency.  We will prescribe Flomax for symptom control.    Subjective:      HPI: Chet Kathleen is a 82 year old male with  Malignant neoplasm of prostate (H) [C61]    The following portions of the patient's history were reviewed and updated as appropriate: allergies, current medications, past family history, past medical history, past social history, past surgical history and problem list.    Assessment                  Body Site:  Pelvis  Stereotactic Radiosurgery: No  Concurrent Therapy: Yes (leuprolide given 23)  Today's Dose: 3200  Total Dose for Pelvis: 7000  Today's Fraction/Total Fraction Pelvis:   Drainage: 0: Absent  Diarrhea W/O Colostomy: 1: Increase of less than 4 stools/day over pretreatment (using imodium and low fiber diet)  Constipation: 0: None  Proctitis: 0: None  Urinary frequency and/or urgency: 2: Increase more than two times normul but less than hourly (every half hour during day)  Urinary Retention: 0 : Normal  Urinary Incontinence: 0: None  Dysuria: 0: None  Nocturia: 4  Urine Color Change: 0: Normal                                     Sexuality Alteration    Change in sexuality: 0: Absent               Emotional Alteration    Copin: Effective  Comfort Alteration   KPS: 90% Can perform normal activity, minor signs of disease  Fatigue (ONS scale): 4: Moderate Fatigue  Pain Location: denies   Nutrition Alteration   Anorexia: 0: None  Nausea: 0: None  Vomitin: None  Weight: 91.9 kg (202 lb 9.6 oz)  Skin  Alteration   Skin Sensation: 0: No problem  Skin Reaction: 0: None  AUA Assessment                                           Accompanied by       Objective:     Exam: Examination reviewed no significant changes.    Vitals:    03/22/23 1101   BP: 137/74   Pulse: 67   Resp: 16   Temp: 97.6  F (36.4  C)   TempSrc: Oral   SpO2: 96%   Weight: 91.9 kg (202 lb 9.6 oz)       Wt Readings from Last 8 Encounters:   03/22/23 91.9 kg (202 lb 9.6 oz)   03/15/23 90.8 kg (200 lb 3.2 oz)   03/08/23 90.3 kg (199 lb)   03/01/23 90 kg (198 lb 8 oz)   10/06/20 83.4 kg (183 lb 14.4 oz)   08/06/13 95.5 kg (210 lb 9.6 oz)       General: Alert and oriented, in no acute distress  Chet has no Erythema.  Aria chart and setup information reviewed    Deneen Evans MD

## 2023-03-22 NOTE — LETTER
3/22/2023         RE: Chet Kathleen  [address]   [place] [address]        Dear Colleague,    Thank you for referring your patient, Chet Kathleen, to the [facility] RADIATION ONCOLOGY [place]. Please see a copy of my visit note below.    RADIATION ONCOLOGY WEEKLY TREATMENT VISIT NOTE      Assessment / Impression     Malignant neoplasm of prostate (H) [C61]    Tolerating radiation therapy well.  All questions and concerns addressed.    Plan:     Continue radiation treatment as prescribed.    Patient has a newly onset of loose bowel and has been taking over-the-counter Imodium A-D with good control.    He also noticed increased urination and nocturia 4 times per night.  Denies any burning sensations and urgency.  We will prescribe Flomax for symptom control.    Subjective:      HPI: Chet Kathleen is a 82 year old male with  Malignant neoplasm of prostate (H) [C61]    The following portions of the patient's history were reviewed and updated as appropriate: allergies, current medications, past family history, past medical history, past social history, past surgical history and problem list.    Assessment                  Body Site:  Pelvis  Stereotactic Radiosurgery: No  Concurrent Therapy: Yes (leuprolide given 23)  Today's Dose: 3200  Total Dose for Pelvis: 7000  Today's Fraction/Total Fraction Pelvis:   Drainage: 0: Absent  Diarrhea W/O Colostomy: 1: Increase of less than 4 stools/day over pretreatment (using imodium and low fiber diet)  Constipation: 0: None  Proctitis: 0: None  Urinary frequency and/or urgency: 2: Increase more than two times normul but less than hourly (every half hour during day)  Urinary Retention: 0 : Normal  Urinary Incontinence: 0: None  Dysuria: 0: None  Nocturia: 4  Urine Color Change: 0: Normal                                     Sexuality Alteration    Change in sexuality: 0: Absent               Emotional Alteration    Copin:  Effective  Comfort Alteration   KPS: 90% Can perform normal activity, minor signs of disease  Fatigue (ONS scale): 4: Moderate Fatigue  Pain Location: denies   Nutrition Alteration   Anorexia: 0: None  Nausea: 0: None  Vomitin: None  Weight: 91.9 kg (202 lb 9.6 oz)  Skin Alteration   Skin Sensation: 0: No problem  Skin Reaction: 0: None  AUA Assessment                                           Accompanied by       Objective:     Exam: Examination reviewed no significant changes.    Vitals:    23 1101   BP: 137/74   Pulse: 67   Resp: 16   Temp: 97.6  F (36.4  C)   TempSrc: Oral   SpO2: 96%   Weight: 91.9 kg (202 lb 9.6 oz)       Wt Readings from Last 8 Encounters:   23 91.9 kg (202 lb 9.6 oz)   03/15/23 90.8 kg (200 lb 3.2 oz)   23 90.3 kg (199 lb)   23 90 kg (198 lb 8 oz)   10/06/20 83.4 kg (183 lb 14.4 oz)   13 95.5 kg (210 lb 9.6 oz)       General: Alert and oriented, in no acute distress  Chet has no Erythema.  Aria chart and setup information reviewed    Deneen Evans MD      Again, thank you for allowing me to participate in the care of your patient.        Sincerely,        Deneen Evans MD

## 2023-03-23 ENCOUNTER — APPOINTMENT (OUTPATIENT)
Dept: RADIATION ONCOLOGY | Facility: CLINIC | Age: 83
End: 2023-03-23
Attending: RADIOLOGY
Payer: COMMERCIAL

## 2023-03-23 PROCEDURE — 77385 HC IMRT TREATMENT DELIVERY, SIMPLE: CPT | Performed by: RADIOLOGY

## 2023-03-23 PROCEDURE — 77014 PR CT GUIDE FOR PLACEMENT RADIATION THERAPY FIELDS: CPT | Mod: 26 | Performed by: RADIOLOGY

## 2023-03-24 ENCOUNTER — APPOINTMENT (OUTPATIENT)
Dept: RADIATION ONCOLOGY | Facility: CLINIC | Age: 83
End: 2023-03-24
Attending: RADIOLOGY
Payer: COMMERCIAL

## 2023-03-24 PROCEDURE — 77014 PR CT GUIDE FOR PLACEMENT RADIATION THERAPY FIELDS: CPT | Mod: 26 | Performed by: RADIOLOGY

## 2023-03-24 PROCEDURE — 77385 HC IMRT TREATMENT DELIVERY, SIMPLE: CPT | Performed by: RADIOLOGY

## 2023-03-27 ENCOUNTER — APPOINTMENT (OUTPATIENT)
Dept: RADIATION ONCOLOGY | Facility: CLINIC | Age: 83
End: 2023-03-27
Attending: RADIOLOGY
Payer: COMMERCIAL

## 2023-03-27 ENCOUNTER — TELEPHONE (OUTPATIENT)
Dept: RADIATION ONCOLOGY | Facility: CLINIC | Age: 83
End: 2023-03-27
Payer: COMMERCIAL

## 2023-03-27 PROCEDURE — 77385 HC IMRT TREATMENT DELIVERY, SIMPLE: CPT | Performed by: STUDENT IN AN ORGANIZED HEALTH CARE EDUCATION/TRAINING PROGRAM

## 2023-03-27 PROCEDURE — 77014 PR CT GUIDE FOR PLACEMENT RADIATION THERAPY FIELDS: CPT | Mod: 26 | Performed by: STUDENT IN AN ORGANIZED HEALTH CARE EDUCATION/TRAINING PROGRAM

## 2023-03-27 NOTE — TELEPHONE ENCOUNTER
Called patient per his request to discuss treatment side effects.  Patient states he has been having diarrhea since 4-5 days into radiation treatment and is using up to 6 imodium per day some days.  Patient states he has been following the low fiber diet and foods to avoid with diarrhea as recommended.  Told patient most side effects of radiation would not start until around week 3 of treatment.  Patient trying to stay hydrated with non-caffeinated drinks.  Patient states he did have some nausea at the end of last week and on Saturday but this seems to have resolved.     Patient was given Rx tamsulosin last week and wanted to know if this could be the cause.  Explained use of tamsulosin for urinary symptoms and patient states he will continue with that.      Told patient I would send a message to Dr. Evans should there be anything different he wanted us to do.  Patient will continue with imodium and see Dr. Evans on Wednesday.

## 2023-03-28 ENCOUNTER — APPOINTMENT (OUTPATIENT)
Dept: RADIATION ONCOLOGY | Facility: CLINIC | Age: 83
End: 2023-03-28
Attending: RADIOLOGY
Payer: COMMERCIAL

## 2023-03-28 PROCEDURE — 77427 RADIATION TX MANAGEMENT X5: CPT | Performed by: RADIOLOGY

## 2023-03-28 PROCEDURE — 77385 HC IMRT TREATMENT DELIVERY, SIMPLE: CPT | Performed by: RADIOLOGY

## 2023-03-28 PROCEDURE — 77336 RADIATION PHYSICS CONSULT: CPT | Performed by: RADIOLOGY

## 2023-03-28 PROCEDURE — 77014 PR CT GUIDE FOR PLACEMENT RADIATION THERAPY FIELDS: CPT | Mod: 26 | Performed by: RADIOLOGY

## 2023-03-29 ENCOUNTER — OFFICE VISIT (OUTPATIENT)
Dept: RADIATION ONCOLOGY | Facility: CLINIC | Age: 83
End: 2023-03-29
Attending: RADIOLOGY
Payer: COMMERCIAL

## 2023-03-29 VITALS
BODY MASS INDEX: 27.19 KG/M2 | OXYGEN SATURATION: 97 % | DIASTOLIC BLOOD PRESSURE: 80 MMHG | RESPIRATION RATE: 16 BRPM | WEIGHT: 200.5 LBS | HEART RATE: 67 BPM | TEMPERATURE: 98 F | SYSTOLIC BLOOD PRESSURE: 148 MMHG

## 2023-03-29 DIAGNOSIS — C61 MALIGNANT NEOPLASM OF PROSTATE (H): Primary | ICD-10-CM

## 2023-03-29 DIAGNOSIS — R30.0 DYSURIA: ICD-10-CM

## 2023-03-29 LAB
ALBUMIN UR-MCNC: NEGATIVE MG/DL
APPEARANCE UR: CLEAR
BILIRUB UR QL STRIP: NEGATIVE
COLOR UR AUTO: ABNORMAL
GLUCOSE UR STRIP-MCNC: NEGATIVE MG/DL
HGB UR QL STRIP: NEGATIVE
KETONES UR STRIP-MCNC: NEGATIVE MG/DL
LEUKOCYTE ESTERASE UR QL STRIP: NEGATIVE
MUCOUS THREADS #/AREA URNS LPF: PRESENT /LPF
NITRATE UR QL: NEGATIVE
PH UR STRIP: 5.5 [PH] (ref 5–7)
RBC URINE: <1 /HPF
SP GR UR STRIP: 1.01 (ref 1–1.03)
SQUAMOUS EPITHELIAL: <1 /HPF
UROBILINOGEN UR STRIP-MCNC: <2 MG/DL
WBC URINE: <1 /HPF

## 2023-03-29 PROCEDURE — 81001 URINALYSIS AUTO W/SCOPE: CPT | Performed by: RADIOLOGY

## 2023-03-29 PROCEDURE — 77014 PR CT GUIDE FOR PLACEMENT RADIATION THERAPY FIELDS: CPT | Mod: 26 | Performed by: RADIOLOGY

## 2023-03-29 PROCEDURE — 77385 HC IMRT TREATMENT DELIVERY, SIMPLE: CPT | Performed by: RADIOLOGY

## 2023-03-29 NOTE — LETTER
3/29/2023         RE: Chet Kathleen  [address]  [place] [address]        Dear Colleague,    Thank you for referring your patient, Chet Kathleen, to the [facility] RADIATION ONCOLOGY [place]. Please see a copy of my visit note below.    RADIATION ONCOLOGY WEEKLY TREATMENT VISIT NOTE      Assessment / Impression     Malignant neoplasm of prostate (H) [C61]    Tolerating radiation therapy well.  All questions and concerns addressed.    Plan:     Continue radiation treatment as prescribed.    Patient did have a few days history of diarrhea and has been taking over-the-counter Imodium A-D with some relief.  His wife also experienced similar symptoms.  I think the patient may have a GI virus at this time.  We will continue to monitor.    Patient indeed has a persistent frequent urination, urgency.  He was given Flomax few days ago with no significant improvement.  I will check UA UC today to make sure no evidence of infection.    Subjective:      HPI: Chet Kathleen is a 82 year old male with  Malignant neoplasm of prostate (H) [C61]    The following portions of the patient's history were reviewed and updated as appropriate: allergies, current medications, past family history, past medical history, past social history, past surgical history and problem list.    Assessment                  Body Site:  Pelvis  Stereotactic Radiosurgery: No  Concurrent Therapy: Yes (leuprolide given 1/9/23)  Today's Dose: 4200  Total Dose for Pelvis: 7000  Today's Fraction/Total Fraction Pelvis: 21/35  Drainage: 0: Absent  Diarrhea W/O Colostomy: 2: Increase of 4-6 stools/day, or nocturnal stools (using 6 imodium per day and low fiber diet)  Constipation: 0: None  Proctitis: 0: None  Urinary frequency and/or urgency: 2: Increase more than two times normul but less than hourly (every half hour during day)  Urinary Retention: 0 : Normal  Urinary Incontinence: 0: None  Dysuria: 0: None  Nocturia: 4  Urine Color  Change: 0: Normal                                     Sexuality Alteration    Change in sexuality: 0: Absent               Emotional Alteration    Copin: Effective  Comfort Alteration   KPS: 90% Can perform normal activity, minor signs of disease  Fatigue (ONS scale): 5: Moderate Fatigue  Pain Location: denies   Nutrition Alteration   Anorexia: 0: None  Nausea: 0: None  Vomitin: None  Weight: 90.9 kg (200 lb 8 oz)  Skin Alteration   Skin Sensation: 0: No problem  Skin Reaction: 0: None  AUA Assessment                                           Accompanied by       Objective:     Exam: Examination reviewed no significant changes.    Vitals:    23 1102   BP: (!) 148/80   Pulse: 67   Resp: 16   Temp: 98  F (36.7  C)   TempSrc: Oral   SpO2: 97%   Weight: 90.9 kg (200 lb 8 oz)       Wt Readings from Last 8 Encounters:   23 90.9 kg (200 lb 8 oz)   23 91.9 kg (202 lb 9.6 oz)   03/15/23 90.8 kg (200 lb 3.2 oz)   23 90.3 kg (199 lb)   23 90 kg (198 lb 8 oz)   10/06/20 83.4 kg (183 lb 14.4 oz)   13 95.5 kg (210 lb 9.6 oz)       General: Alert and oriented, in no acute distress  Chet has no Erythema.  Aria chart and setup information reviewed    Deneen Evans MD      Again, thank you for allowing me to participate in the care of your patient.        Sincerely,        Deneen Evans MD

## 2023-03-29 NOTE — PROGRESS NOTES
RADIATION ONCOLOGY WEEKLY TREATMENT VISIT NOTE      Assessment / Impression     Malignant neoplasm of prostate (H) [C61]    Tolerating radiation therapy well.  All questions and concerns addressed.    Plan:     Continue radiation treatment as prescribed.    Patient did have a few days history of diarrhea and has been taking over-the-counter Imodium A-D with some relief.  His wife also experienced similar symptoms.  I think the patient may have a GI virus at this time.  We will continue to monitor.    Patient indeed has a persistent frequent urination, urgency.  He was given Flomax few days ago with no significant improvement.  I will check UA UC today to make sure no evidence of infection.    Subjective:      HPI: Chet Kathleen is a 82 year old male with  Malignant neoplasm of prostate (H) [C61]    The following portions of the patient's history were reviewed and updated as appropriate: allergies, current medications, past family history, past medical history, past social history, past surgical history and problem list.    Assessment                  Body Site:  Pelvis  Stereotactic Radiosurgery: No  Concurrent Therapy: Yes (leuprolide given 23)  Today's Dose: 4200  Total Dose for Pelvis: 7000  Today's Fraction/Total Fraction Pelvis:   Drainage: 0: Absent  Diarrhea W/O Colostomy: 2: Increase of 4-6 stools/day, or nocturnal stools (using 6 imodium per day and low fiber diet)  Constipation: 0: None  Proctitis: 0: None  Urinary frequency and/or urgency: 2: Increase more than two times normul but less than hourly (every half hour during day)  Urinary Retention: 0 : Normal  Urinary Incontinence: 0: None  Dysuria: 0: None  Nocturia: 4  Urine Color Change: 0: Normal                                     Sexuality Alteration    Change in sexuality: 0: Absent               Emotional Alteration    Copin: Effective  Comfort Alteration   KPS: 90% Can perform normal activity, minor signs of  disease  Fatigue (ONS scale): 5: Moderate Fatigue  Pain Location: denies   Nutrition Alteration   Anorexia: 0: None  Nausea: 0: None  Vomitin: None  Weight: 90.9 kg (200 lb 8 oz)  Skin Alteration   Skin Sensation: 0: No problem  Skin Reaction: 0: None  AUA Assessment                                           Accompanied by       Objective:     Exam: Examination reviewed no significant changes.    Vitals:    23 1102   BP: (!) 148/80   Pulse: 67   Resp: 16   Temp: 98  F (36.7  C)   TempSrc: Oral   SpO2: 97%   Weight: 90.9 kg (200 lb 8 oz)       Wt Readings from Last 8 Encounters:   23 90.9 kg (200 lb 8 oz)   23 91.9 kg (202 lb 9.6 oz)   03/15/23 90.8 kg (200 lb 3.2 oz)   23 90.3 kg (199 lb)   23 90 kg (198 lb 8 oz)   10/06/20 83.4 kg (183 lb 14.4 oz)   13 95.5 kg (210 lb 9.6 oz)       General: Alert and oriented, in no acute distress  Chet has no Erythema.  Aria chart and setup information reviewed    Deneen Evans MD

## 2023-03-30 ENCOUNTER — APPOINTMENT (OUTPATIENT)
Dept: RADIATION ONCOLOGY | Facility: CLINIC | Age: 83
End: 2023-03-30
Attending: RADIOLOGY
Payer: COMMERCIAL

## 2023-03-30 PROCEDURE — 77014 PR CT GUIDE FOR PLACEMENT RADIATION THERAPY FIELDS: CPT | Mod: 26 | Performed by: STUDENT IN AN ORGANIZED HEALTH CARE EDUCATION/TRAINING PROGRAM

## 2023-03-30 PROCEDURE — 77385 HC IMRT TREATMENT DELIVERY, SIMPLE: CPT | Performed by: STUDENT IN AN ORGANIZED HEALTH CARE EDUCATION/TRAINING PROGRAM

## 2023-03-31 ENCOUNTER — ALLIED HEALTH/NURSE VISIT (OUTPATIENT)
Dept: RADIATION ONCOLOGY | Facility: CLINIC | Age: 83
End: 2023-03-31
Attending: RADIOLOGY
Payer: COMMERCIAL

## 2023-03-31 VITALS
OXYGEN SATURATION: 96 % | DIASTOLIC BLOOD PRESSURE: 67 MMHG | SYSTOLIC BLOOD PRESSURE: 126 MMHG | HEART RATE: 64 BPM | TEMPERATURE: 98.6 F

## 2023-03-31 PROCEDURE — 77385 HC IMRT TREATMENT DELIVERY, SIMPLE: CPT | Performed by: STUDENT IN AN ORGANIZED HEALTH CARE EDUCATION/TRAINING PROGRAM

## 2023-03-31 PROCEDURE — 77336 RADIATION PHYSICS CONSULT: CPT | Performed by: RADIOLOGY

## 2023-03-31 PROCEDURE — 77014 PR CT GUIDE FOR PLACEMENT RADIATION THERAPY FIELDS: CPT | Mod: 26 | Performed by: STUDENT IN AN ORGANIZED HEALTH CARE EDUCATION/TRAINING PROGRAM

## 2023-03-31 PROCEDURE — 77427 RADIATION TX MANAGEMENT X5: CPT | Performed by: RADIOLOGY

## 2023-03-31 ASSESSMENT — PAIN SCALES - GENERAL: PAINLEVEL: NO PAIN (0)

## 2023-03-31 NOTE — PROGRESS NOTES
Pt walked in for his radiation therapy with incontinent loose BM. VSS. Pt denies pain. Dr. Evans was notified. Pt advised to see his primary care provider

## 2023-04-03 ENCOUNTER — APPOINTMENT (OUTPATIENT)
Dept: RADIATION ONCOLOGY | Facility: CLINIC | Age: 83
End: 2023-04-03
Attending: RADIOLOGY
Payer: COMMERCIAL

## 2023-04-03 PROCEDURE — 77385 HC IMRT TREATMENT DELIVERY, SIMPLE: CPT | Performed by: RADIOLOGY

## 2023-04-03 PROCEDURE — 77014 PR CT GUIDE FOR PLACEMENT RADIATION THERAPY FIELDS: CPT | Mod: 26 | Performed by: RADIOLOGY

## 2023-04-04 ENCOUNTER — APPOINTMENT (OUTPATIENT)
Dept: RADIATION ONCOLOGY | Facility: CLINIC | Age: 83
End: 2023-04-04
Attending: RADIOLOGY
Payer: COMMERCIAL

## 2023-04-04 PROCEDURE — 77385 HC IMRT TREATMENT DELIVERY, SIMPLE: CPT | Performed by: STUDENT IN AN ORGANIZED HEALTH CARE EDUCATION/TRAINING PROGRAM

## 2023-04-04 PROCEDURE — 77014 PR CT GUIDE FOR PLACEMENT RADIATION THERAPY FIELDS: CPT | Mod: 26 | Performed by: RADIOLOGY

## 2023-04-05 ENCOUNTER — APPOINTMENT (OUTPATIENT)
Dept: RADIATION ONCOLOGY | Facility: CLINIC | Age: 83
End: 2023-04-05
Attending: RADIOLOGY
Payer: COMMERCIAL

## 2023-04-05 VITALS
SYSTOLIC BLOOD PRESSURE: 144 MMHG | DIASTOLIC BLOOD PRESSURE: 70 MMHG | BODY MASS INDEX: 26.76 KG/M2 | RESPIRATION RATE: 16 BRPM | WEIGHT: 197.3 LBS | OXYGEN SATURATION: 97 % | TEMPERATURE: 98 F | HEART RATE: 64 BPM

## 2023-04-05 DIAGNOSIS — C61 MALIGNANT NEOPLASM OF PROSTATE (H): Primary | ICD-10-CM

## 2023-04-05 PROCEDURE — 77385 HC IMRT TREATMENT DELIVERY, SIMPLE: CPT | Performed by: RADIOLOGY

## 2023-04-05 PROCEDURE — 77014 PR CT GUIDE FOR PLACEMENT RADIATION THERAPY FIELDS: CPT | Mod: 26 | Performed by: RADIOLOGY

## 2023-04-05 RX ORDER — OXYBUTYNIN CHLORIDE 5 MG/1
1 TABLET ORAL
COMMUNITY
Start: 2023-03-31

## 2023-04-05 NOTE — LETTER
2023         RE: Chet Kathleen  [address]  [place] [address]        Dear Colleague,    Thank you for referring your patient, Chet Kathleen, to the [facility] RADIATION ONCOLOGY [place]. Please see a copy of my visit note below.      RADIATION ONCOLOGY WEEKLY TREATMENT VISIT NOTE      Assessment / Impression     Malignant neoplasm of prostate (H) [C61]     Tolerating radiation therapy well.  All questions and concerns addressed.    Plan:     Continue radiation treatment as prescribed.    His bowel and bladder functions has been stable and patient is currently taking Imodium A-D as needed with good control.    Subjective:      HPI: Chet Kathleen is a 82 year old male with  Malignant neoplasm of prostate (H) [C61]    The following portions of the patient's history were reviewed and updated as appropriate: allergies, current medications, past family history, past medical history, past social history, past surgical history and problem list.    Assessment                  Body Site:  Pelvis  Stereotactic Radiosurgery: No  Concurrent Therapy: Yes (leuprolide given 23)  Today's Dose: 5200  Total Dose for Pelvis: 7000  Today's Fraction/Total Fraction Pelvis: 26/35  Drainage: 0: Absent  Diarrhea W/O Colostomy: 1: Increase of less than 4 stools/day over pretreatment (using 4-6 imodium per day and low fiber diet)  Constipation: 0: None  Proctitis: 0: None  Urinary frequency and/or urgency: 2: Increase more than two times normul but less than hourly (every hour during day)  Urinary Retention: 0 : Normal  Urinary Incontinence: 0: None  Dysuria: 0: None  Nocturia: 4  Urine Color Change: 0: Normal                                     Sexuality Alteration    Change in sexuality: 0: Absent               Emotional Alteration    Copin: Effective  Comfort Alteration   KPS: 90% Can perform normal activity, minor signs of disease  Fatigue (ONS scale): 5: Moderate Fatigue  Pain  Location: denies   Nutrition Alteration   Anorexia: 0: None  Nausea: 0: None  Vomitin: None  Weight: 89.5 kg (197 lb 4.8 oz)  Skin Alteration   Skin Sensation: 0: No problem  Skin Reaction: 0: None  AUA Assessment                                           Accompanied by       Objective:     Exam: Examination reviewed no significant changes.    Vitals:    23 1054   BP: (!) 144/70   Pulse: 64   Resp: 16   Temp: 98  F (36.7  C)   TempSrc: Oral   SpO2: 97%   Weight: 89.5 kg (197 lb 4.8 oz)       Wt Readings from Last 8 Encounters:   23 89.5 kg (197 lb 4.8 oz)   23 90.9 kg (200 lb 8 oz)   23 91.9 kg (202 lb 9.6 oz)   03/15/23 90.8 kg (200 lb 3.2 oz)   23 90.3 kg (199 lb)   23 90 kg (198 lb 8 oz)   10/06/20 83.4 kg (183 lb 14.4 oz)   13 95.5 kg (210 lb 9.6 oz)       General: Alert and oriented, in no acute distress  Chet has no Erythema.  Aria chart and setup information reviewed    Deneen Evans MD        Again, thank you for allowing me to participate in the care of your patient.        Sincerely,        Deneen Evans MD

## 2023-04-05 NOTE — PROGRESS NOTES
RADIATION ONCOLOGY WEEKLY TREATMENT VISIT NOTE      Assessment / Impression     Malignant neoplasm of prostate (H) [C61]     Tolerating radiation therapy well.  All questions and concerns addressed.    Plan:     Continue radiation treatment as prescribed.    His bowel and bladder functions has been stable and patient is currently taking Imodium A-D as needed with good control.    Subjective:      HPI: Chet Kathleen is a 82 year old male with  Malignant neoplasm of prostate (H) [C61]    The following portions of the patient's history were reviewed and updated as appropriate: allergies, current medications, past family history, past medical history, past social history, past surgical history and problem list.    Assessment                  Body Site:  Pelvis  Stereotactic Radiosurgery: No  Concurrent Therapy: Yes (leuprolide given 23)  Today's Dose: 5200  Total Dose for Pelvis: 7000  Today's Fraction/Total Fraction Pelvis:   Drainage: 0: Absent  Diarrhea W/O Colostomy: 1: Increase of less than 4 stools/day over pretreatment (using 4-6 imodium per day and low fiber diet)  Constipation: 0: None  Proctitis: 0: None  Urinary frequency and/or urgency: 2: Increase more than two times normul but less than hourly (every hour during day)  Urinary Retention: 0 : Normal  Urinary Incontinence: 0: None  Dysuria: 0: None  Nocturia: 4  Urine Color Change: 0: Normal                                     Sexuality Alteration    Change in sexuality: 0: Absent               Emotional Alteration    Copin: Effective  Comfort Alteration   KPS: 90% Can perform normal activity, minor signs of disease  Fatigue (ONS scale): 5: Moderate Fatigue  Pain Location: denies   Nutrition Alteration   Anorexia: 0: None  Nausea: 0: None  Vomitin: None  Weight: 89.5 kg (197 lb 4.8 oz)  Skin Alteration   Skin Sensation: 0: No problem  Skin Reaction: 0: None  AUA Assessment                                           Accompanied  by       Objective:     Exam: Examination reviewed no significant changes.    Vitals:    04/05/23 1054   BP: (!) 144/70   Pulse: 64   Resp: 16   Temp: 98  F (36.7  C)   TempSrc: Oral   SpO2: 97%   Weight: 89.5 kg (197 lb 4.8 oz)       Wt Readings from Last 8 Encounters:   04/05/23 89.5 kg (197 lb 4.8 oz)   03/29/23 90.9 kg (200 lb 8 oz)   03/22/23 91.9 kg (202 lb 9.6 oz)   03/15/23 90.8 kg (200 lb 3.2 oz)   03/08/23 90.3 kg (199 lb)   03/01/23 90 kg (198 lb 8 oz)   10/06/20 83.4 kg (183 lb 14.4 oz)   08/06/13 95.5 kg (210 lb 9.6 oz)       General: Alert and oriented, in no acute distress  Chet has no Erythema.  Aria chart and setup information reviewed    Deneen Evans MD

## 2023-04-06 ENCOUNTER — APPOINTMENT (OUTPATIENT)
Dept: RADIATION ONCOLOGY | Facility: CLINIC | Age: 83
End: 2023-04-06
Attending: RADIOLOGY
Payer: COMMERCIAL

## 2023-04-06 PROCEDURE — 77014 PR CT GUIDE FOR PLACEMENT RADIATION THERAPY FIELDS: CPT | Mod: 26 | Performed by: STUDENT IN AN ORGANIZED HEALTH CARE EDUCATION/TRAINING PROGRAM

## 2023-04-06 PROCEDURE — 77385 HC IMRT TREATMENT DELIVERY, SIMPLE: CPT | Performed by: STUDENT IN AN ORGANIZED HEALTH CARE EDUCATION/TRAINING PROGRAM

## 2023-04-07 ENCOUNTER — APPOINTMENT (OUTPATIENT)
Dept: RADIATION ONCOLOGY | Facility: CLINIC | Age: 83
End: 2023-04-07
Attending: RADIOLOGY
Payer: COMMERCIAL

## 2023-04-07 PROCEDURE — 77385 HC IMRT TREATMENT DELIVERY, SIMPLE: CPT | Performed by: STUDENT IN AN ORGANIZED HEALTH CARE EDUCATION/TRAINING PROGRAM

## 2023-04-07 PROCEDURE — 77014 PR CT GUIDE FOR PLACEMENT RADIATION THERAPY FIELDS: CPT | Mod: 26 | Performed by: RADIOLOGY

## 2023-04-10 ENCOUNTER — APPOINTMENT (OUTPATIENT)
Dept: RADIATION ONCOLOGY | Facility: CLINIC | Age: 83
End: 2023-04-10
Attending: RADIOLOGY
Payer: COMMERCIAL

## 2023-04-10 PROCEDURE — 77332 RADIATION TREATMENT AID(S): CPT | Mod: 26 | Performed by: RADIOLOGY

## 2023-04-10 PROCEDURE — 77385 HC IMRT TREATMENT DELIVERY, SIMPLE: CPT | Performed by: RADIOLOGY

## 2023-04-10 PROCEDURE — 77332 RADIATION TREATMENT AID(S): CPT | Mod: XU | Performed by: RADIOLOGY

## 2023-04-10 PROCEDURE — 77014 PR CT GUIDE FOR PLACEMENT RADIATION THERAPY FIELDS: CPT | Mod: 26 | Performed by: RADIOLOGY

## 2023-04-11 ENCOUNTER — APPOINTMENT (OUTPATIENT)
Dept: RADIATION ONCOLOGY | Facility: CLINIC | Age: 83
End: 2023-04-11
Attending: RADIOLOGY
Payer: COMMERCIAL

## 2023-04-11 PROCEDURE — 77427 RADIATION TX MANAGEMENT X5: CPT | Performed by: RADIOLOGY

## 2023-04-11 PROCEDURE — 77014 PR CT GUIDE FOR PLACEMENT RADIATION THERAPY FIELDS: CPT | Mod: 26 | Performed by: STUDENT IN AN ORGANIZED HEALTH CARE EDUCATION/TRAINING PROGRAM

## 2023-04-11 PROCEDURE — 77336 RADIATION PHYSICS CONSULT: CPT | Performed by: RADIOLOGY

## 2023-04-11 PROCEDURE — 77385 HC IMRT TREATMENT DELIVERY, SIMPLE: CPT | Performed by: STUDENT IN AN ORGANIZED HEALTH CARE EDUCATION/TRAINING PROGRAM

## 2023-04-12 ENCOUNTER — APPOINTMENT (OUTPATIENT)
Dept: RADIATION ONCOLOGY | Facility: CLINIC | Age: 83
End: 2023-04-12
Attending: RADIOLOGY
Payer: COMMERCIAL

## 2023-04-12 VITALS
TEMPERATURE: 98 F | DIASTOLIC BLOOD PRESSURE: 81 MMHG | BODY MASS INDEX: 26.99 KG/M2 | RESPIRATION RATE: 16 BRPM | WEIGHT: 199 LBS | SYSTOLIC BLOOD PRESSURE: 156 MMHG | OXYGEN SATURATION: 98 % | HEART RATE: 63 BPM

## 2023-04-12 DIAGNOSIS — C61 MALIGNANT NEOPLASM OF PROSTATE (H): Primary | ICD-10-CM

## 2023-04-12 PROCEDURE — 77385 HC IMRT TREATMENT DELIVERY, SIMPLE: CPT | Performed by: RADIOLOGY

## 2023-04-12 PROCEDURE — 77014 PR CT GUIDE FOR PLACEMENT RADIATION THERAPY FIELDS: CPT | Mod: 26 | Performed by: RADIOLOGY

## 2023-04-12 NOTE — PROGRESS NOTES
RADIATION ONCOLOGY WEEKLY TREATMENT VISIT NOTE      Assessment / Impression     Malignant neoplasm of prostate (H) [C61]     Tolerating radiation therapy well.  All questions and concerns addressed.    Plan:     Continue radiation treatment as prescribed.    Return to radiation oncology in 4-8 weeks for follow-up after completion of the radiation therapy.    Subjective:      HPI: Chet Kathleen is a 82 year old male with  Malignant neoplasm of prostate (H) [C61]    The following portions of the patient's history were reviewed and updated as appropriate: allergies, current medications, past family history, past medical history, past social history, past surgical history and problem list.    Assessment                  Body Site:  Pelvis  Stereotactic Radiosurgery: No  Concurrent Therapy: Yes (leuprolide given 23)  Today's Dose: 6200  Total Dose for Pelvis: 7000  Today's Fraction/Total Fraction Pelvis:   Drainage: 0: Absent  Diarrhea W/O Colostomy: 1: Increase of less than 4 stools/day over pretreatment (using 4-6 imodium per day and low fiber diet)  Constipation: 0: None  Proctitis: 0: None  Urinary frequency and/or urgency: 2: Increase more than two times normul but less than hourly (every hour during day)  Urinary Retention: 0 : Normal  Urinary Incontinence: 0: None  Dysuria: 0: None  Nocturia: 5  Urine Color Change: 0: Normal                                     Sexuality Alteration    Change in sexuality: 0: Absent               Emotional Alteration    Copin: Effective  Comfort Alteration   KPS: 90% Can perform normal activity, minor signs of disease  Fatigue (ONS scale): 5: Moderate Fatigue  Pain Location: denies   Nutrition Alteration   Anorexia: 0: None  Nausea: 0: None  Vomitin: None  Weight: 90.3 kg (199 lb)  Skin Alteration   Skin Sensation: 0: No problem  Skin Reaction: 0: None  AUA Assessment                                           Accompanied by       Objective:     Exam:  Examination reviewed no significant changes.    Vitals:    04/12/23 1104   BP: (!) 156/81   Pulse: 63   Resp: 16   Temp: 98  F (36.7  C)   TempSrc: Oral   SpO2: 98%   Weight: 90.3 kg (199 lb)       Wt Readings from Last 8 Encounters:   04/12/23 90.3 kg (199 lb)   04/05/23 89.5 kg (197 lb 4.8 oz)   03/29/23 90.9 kg (200 lb 8 oz)   03/22/23 91.9 kg (202 lb 9.6 oz)   03/15/23 90.8 kg (200 lb 3.2 oz)   03/08/23 90.3 kg (199 lb)   03/01/23 90 kg (198 lb 8 oz)   10/06/20 83.4 kg (183 lb 14.4 oz)       General: Alert and oriented, in no acute distress  Chet has no Erythema.  Aria chart and setup information reviewed    Deneen Evans MD

## 2023-04-12 NOTE — LETTER
2023         RE: Chet Kathleen  [address]  [place] [address]        Dear Colleague,    Thank you for referring your patient, Chet Kathleen, to the [facility] RADIATION ONCOLOGY [place]. Please see a copy of my visit note below.    RADIATION ONCOLOGY WEEKLY TREATMENT VISIT NOTE      Assessment / Impression     Malignant neoplasm of prostate (H) [C61]     Tolerating radiation therapy well.  All questions and concerns addressed.    Plan:     Continue radiation treatment as prescribed.    Return to radiation oncology in 4-8 weeks for follow-up after completion of the radiation therapy.    Subjective:      HPI: Chet Kahtleen is a 82 year old male with  Malignant neoplasm of prostate (H) [C61]    The following portions of the patient's history were reviewed and updated as appropriate: allergies, current medications, past family history, past medical history, past social history, past surgical history and problem list.    Assessment                  Body Site:  Pelvis  Stereotactic Radiosurgery: No  Concurrent Therapy: Yes (leuprolide given 23)  Today's Dose: 6200  Total Dose for Pelvis: 7000  Today's Fraction/Total Fraction Pelvis:   Drainage: 0: Absent  Diarrhea W/O Colostomy: 1: Increase of less than 4 stools/day over pretreatment (using 4-6 imodium per day and low fiber diet)  Constipation: 0: None  Proctitis: 0: None  Urinary frequency and/or urgency: 2: Increase more than two times normul but less than hourly (every hour during day)  Urinary Retention: 0 : Normal  Urinary Incontinence: 0: None  Dysuria: 0: None  Nocturia: 5  Urine Color Change: 0: Normal                                     Sexuality Alteration    Change in sexuality: 0: Absent               Emotional Alteration    Copin: Effective  Comfort Alteration   KPS: 90% Can perform normal activity, minor signs of disease  Fatigue (ONS scale): 5: Moderate Fatigue  Pain Location: denies   Nutrition  Alteration   Anorexia: 0: None  Nausea: 0: None  Vomitin: None  Weight: 90.3 kg (199 lb)  Skin Alteration   Skin Sensation: 0: No problem  Skin Reaction: 0: None  AUA Assessment                                           Accompanied by       Objective:     Exam: Examination reviewed no significant changes.    Vitals:    23 1104   BP: (!) 156/81   Pulse: 63   Resp: 16   Temp: 98  F (36.7  C)   TempSrc: Oral   SpO2: 98%   Weight: 90.3 kg (199 lb)       Wt Readings from Last 8 Encounters:   23 90.3 kg (199 lb)   23 89.5 kg (197 lb 4.8 oz)   23 90.9 kg (200 lb 8 oz)   23 91.9 kg (202 lb 9.6 oz)   03/15/23 90.8 kg (200 lb 3.2 oz)   23 90.3 kg (199 lb)   23 90 kg (198 lb 8 oz)   10/06/20 83.4 kg (183 lb 14.4 oz)       General: Alert and oriented, in no acute distress  Chet has no Erythema.  Aria chart and setup information reviewed    Deneen Evans MD      Again, thank you for allowing me to participate in the care of your patient.        Sincerely,        Deneen Evans MD

## 2023-04-13 ENCOUNTER — APPOINTMENT (OUTPATIENT)
Dept: RADIATION ONCOLOGY | Facility: CLINIC | Age: 83
End: 2023-04-13
Attending: RADIOLOGY
Payer: COMMERCIAL

## 2023-04-13 PROCEDURE — 77014 PR CT GUIDE FOR PLACEMENT RADIATION THERAPY FIELDS: CPT | Mod: 26 | Performed by: RADIOLOGY

## 2023-04-13 PROCEDURE — 77385 HC IMRT TREATMENT DELIVERY, SIMPLE: CPT | Performed by: STUDENT IN AN ORGANIZED HEALTH CARE EDUCATION/TRAINING PROGRAM

## 2023-04-14 ENCOUNTER — APPOINTMENT (OUTPATIENT)
Dept: RADIATION ONCOLOGY | Facility: CLINIC | Age: 83
End: 2023-04-14
Attending: RADIOLOGY
Payer: COMMERCIAL

## 2023-04-14 PROCEDURE — 77014 PR CT GUIDE FOR PLACEMENT RADIATION THERAPY FIELDS: CPT | Mod: 26 | Performed by: STUDENT IN AN ORGANIZED HEALTH CARE EDUCATION/TRAINING PROGRAM

## 2023-04-14 PROCEDURE — 77385 HC IMRT TREATMENT DELIVERY, SIMPLE: CPT | Performed by: STUDENT IN AN ORGANIZED HEALTH CARE EDUCATION/TRAINING PROGRAM

## 2023-04-17 ENCOUNTER — APPOINTMENT (OUTPATIENT)
Dept: RADIATION ONCOLOGY | Facility: CLINIC | Age: 83
End: 2023-04-17
Attending: RADIOLOGY
Payer: COMMERCIAL

## 2023-04-17 PROCEDURE — 77385 HC IMRT TREATMENT DELIVERY, SIMPLE: CPT | Performed by: RADIOLOGY

## 2023-04-17 PROCEDURE — 77014 PR CT GUIDE FOR PLACEMENT RADIATION THERAPY FIELDS: CPT | Mod: 26 | Performed by: RADIOLOGY

## 2023-04-18 ENCOUNTER — APPOINTMENT (OUTPATIENT)
Dept: RADIATION ONCOLOGY | Facility: CLINIC | Age: 83
End: 2023-04-18
Attending: RADIOLOGY
Payer: COMMERCIAL

## 2023-04-18 PROCEDURE — 77336 RADIATION PHYSICS CONSULT: CPT | Performed by: RADIOLOGY

## 2023-04-18 PROCEDURE — 77385 HC IMRT TREATMENT DELIVERY, SIMPLE: CPT | Performed by: STUDENT IN AN ORGANIZED HEALTH CARE EDUCATION/TRAINING PROGRAM

## 2023-04-18 PROCEDURE — 77014 PR CT GUIDE FOR PLACEMENT RADIATION THERAPY FIELDS: CPT | Mod: 26 | Performed by: RADIOLOGY

## 2023-04-18 PROCEDURE — 77427 RADIATION TX MANAGEMENT X5: CPT | Performed by: RADIOLOGY

## 2023-04-18 NOTE — PROGRESS NOTES
Radiation Treatment Summary          Patient: Chet Kathleen            MRN: [id number]           : 1940        Care Provider: Deneen Evans MD         Date of Service: 2023        Gabino Martines MD  [facility] UROLOG[facility]  [address] [place] [address] [address]  [place] [address]              Dear Dr. Martines:     Your patient Mr. Chet Kathleen completed his radiation therapy on 2023. As you know Mr. Kathleen is a 82 year old male with a diagnosis of high risk prostate cancer, pathologic stage T3b N0 M0, [place] grade 4+5 = 9 involving both side of prostate gland and a pretherapy PSA of 7.0.  Patient is status post robotic assisted prostatectomy with pathology showed evidence of seminal vesicle invasion and positive margin in the left anterior apex with 4 negative lymph nodes.  Patient presented with a recent history of rising PSA to 0.14. Staging PSMA PET scans showed no evidence of recurrence.  Patient elected proceed with hormone therapy with definitive external beam radiation therapy as his treatment choice and received radiation therapy in our clinic with a total dose of 7000/4400 cGy in  treatments targeted to the prostate bed/pelvic lymph nodes using IMRT/IGRT given from 3/1/2023- 2023.  He tolerated radiation therapy reasonably well with expected acute side effect.  He is scheduled to return to radiation oncology in 4 weeks for routine post therapy office follow-up.    Again, thank you very much for the referral and allowing me to participate in the care of this patient.  If you have any questions or concerns about this patient, please do not hesitate to call.          Sincerely,      Deneen Evans MD, PhD  Department of Radiation Oncology   [facility] Radiation Oncology  Tel: [phone]  Page: [phone]    [facility]  [address] [place] [address]     [facility]  [address] [place] [address]    CC:  Patient Care Team:  Ashlyn Barnes MD as PCP - General  (Family Practice)  Deneen Evans MD as MD (Radiation Oncology)  Gabino Martines MD as MD (Urology)  Deneen Evans MD as Assigned Cancer Care Provider

## 2023-04-18 NOTE — PROGRESS NOTES
Patient here ambulatory for final radiation therapy treatment for his prostate cancer.  Patient continues to have bowel and bladder issues, loose stools, urinary frequency and some incontinence.  Informed patient that bowel and bladder issues he had prior to treatment would not be resolved by the treatment.  Encouraged patient to continue with Kegel exercises.  Written discharge instructions reviewed and given to patient.  Follow-up with Dr. Evans in about 4 to 6 weeks with a PSA.  Patient left ambulatory and told to stop at  to make appointments.

## 2023-04-26 ENCOUNTER — APPOINTMENT (OUTPATIENT)
Dept: RADIATION ONCOLOGY | Facility: HOSPITAL | Age: 83
End: 2023-04-26
Attending: RADIOLOGY
Payer: COMMERCIAL

## 2023-04-28 ENCOUNTER — TELEPHONE (OUTPATIENT)
Dept: RADIATION ONCOLOGY | Facility: CLINIC | Age: 83
End: 2023-04-28
Payer: COMMERCIAL

## 2023-04-28 NOTE — TELEPHONE ENCOUNTER
Called patient for routine follow-up call status post radiation for his prostate cancer.  Patient continues to have ongoing urinary frequency.  Patient also states he is having up to 3 loose stools a day and incontinent of stool at times.  Patient went back to eating his normal diet with fast food and high fiber.  Encouraged patient to go back to a low fiber diet and avoid foods that cause him to have more loose stools.  Also encouraged patient to use Imodium as needed.  Patient also has complaints of some fatigue.  Told patient that side effects from radiation peak at 7 to 10 days, around this time for him, and he should start to improve and heal over the next 6 to 8 weeks.  Told patient to call us if he is having increase in any symptoms.  Told patient that he should be hitting his peak this weekend and should be starting to feel better over the next week and have some improvements.  Confirmed with patient follow-up visit appointments.  Told patient to call us with any concerns he may have.  Patient appreciative of call.

## 2023-05-05 ENCOUNTER — TELEPHONE (OUTPATIENT)
Dept: RADIATION ONCOLOGY | Facility: CLINIC | Age: 83
End: 2023-05-05
Payer: COMMERCIAL

## 2023-05-05 NOTE — TELEPHONE ENCOUNTER
Received message from patient asking for call back in regards to symptoms.  Called patient and he states he continues to have frequent bowel incontinence and looseness.  Reinforced a bowel regimen including a low fiber diet and use of Imodium.  Also encouraged patient to do his Kegel exercises to increase his pelvic floor muscle strength.  Encouraged patient to drink plenty of fluids when having diarrhea.    Patient states he continues on the bladder medication given to him by both Dr. Martines and Dr. Evans.  Patient states he gets very short of breath and is fatigued.  Told patient the fatigue could be from the treatment and should start to improve over the next several weeks.    Encouraged patient to talk to primary care about the shortness of breath as that should not be from the radiation treatment to his prostate.  Patient appreciative of call back.

## 2023-05-22 ENCOUNTER — LAB (OUTPATIENT)
Dept: INFUSION THERAPY | Facility: CLINIC | Age: 83
End: 2023-05-22
Attending: RADIOLOGY
Payer: COMMERCIAL

## 2023-05-22 DIAGNOSIS — C61 MALIGNANT NEOPLASM OF PROSTATE (H): ICD-10-CM

## 2023-05-22 LAB — PSA SERPL DL<=0.01 NG/ML-MCNC: <0.01 NG/ML

## 2023-05-22 PROCEDURE — 84153 ASSAY OF PSA TOTAL: CPT

## 2023-05-22 PROCEDURE — 36415 COLL VENOUS BLD VENIPUNCTURE: CPT

## 2023-05-24 ENCOUNTER — OFFICE VISIT (OUTPATIENT)
Dept: RADIATION ONCOLOGY | Facility: CLINIC | Age: 83
End: 2023-05-24
Attending: RADIOLOGY
Payer: COMMERCIAL

## 2023-05-24 VITALS
TEMPERATURE: 98 F | RESPIRATION RATE: 16 BRPM | OXYGEN SATURATION: 97 % | DIASTOLIC BLOOD PRESSURE: 73 MMHG | BODY MASS INDEX: 26.32 KG/M2 | HEART RATE: 66 BPM | SYSTOLIC BLOOD PRESSURE: 114 MMHG | WEIGHT: 194.1 LBS

## 2023-05-24 DIAGNOSIS — C61 MALIGNANT NEOPLASM OF PROSTATE (H): Primary | ICD-10-CM

## 2023-05-24 PROCEDURE — G0463 HOSPITAL OUTPT CLINIC VISIT: HCPCS | Performed by: RADIOLOGY

## 2023-05-24 ASSESSMENT — PAIN SCALES - GENERAL: PAINLEVEL: NO PAIN (0)

## 2023-05-24 NOTE — PROGRESS NOTES
[facility] Radiation Oncology Follow Up     Patient: Chet Kathleen  MRN: [id number]  Date of Service: 05/24/2023       DISEASE TREATED:  High risk prostate cancer, pathologic stage T3b N0 M0, [place] grade 4+5 = 9 involving both side of prostate gland and a pretherapy PSA of 7.0.  Patient is status post robotic assisted prostatectomy with pathology showed evidence of seminal vesicle invasion and positive margin in the left anterior apex with 4 negative lymph nodes.  Patient presented with a recent history of rising PSA to 0.14.  Staging PSMA PET scans showed no evidence of recurrence.      TYPE OF RADIATION THERAPY ADMINISTERED:  7000/4400 cGy in 35/22 treatments targeted to the prostate bed/pelvic lymph nodes using IMRT/IGRT given from 3/1/2023- 4/18/2023.     INTERVAL SINCE COMPLETION OF RADIATION THERAPY: 1 month.      SUBJECTIVE:  Mr. Kathleen is a 82 year old male who has been in his usual state of health until recently.  The patient presented with abnormal elevation of PSA from 5.6 in 1/2020 to 7.0 on 2/3/2021 for which he was seeking further evaluation.  Transrectal ultrasound study and biopsy on 4/16/2021 reviewed adenocarcinoma, [place] grade 4+5 = 9, 4+3 = 7 and 3+3 = 6 involving both side of prostate gland.  Patient underwent staging work-up including CT abdomen pelvis and bone scan on 4/28/2021 which showed no evidence of lymph nodes and systemic metastasis.  Patient received robotic assisted radical prostatectomy on 5/27/2021.  The pathology revealed prostatic adenocarcinoma, [place] grade 4+5 = 9 involving both side of prostate gland with evidence of extraprostatic extension in the right anterior base and the left anterior apex, tumor invasion into left and right seminal vesicle and right vas deferens, and positive margin in the left anterior apex.  There is also evidence of lymphovascular invasion.  4 removed pelvic lymph nodes showed no evidence of metastatic disease.  Patient has been  recovering well since surgery.  He has only occasional stress incontinence and wearing a pad as needed.  He is PSA has been undetectable since surgery.  He was find to have detectable PSA to 0.14 on 11/11/2022.  The PSMA PET scan on 12/28/2022 showed no evidence of recurrence.  Patient elected proceed with hormone therapy with definitive external beam radiation therapy as his treatment choice and received radiation therapy in our clinic with a total dose of 7000/4400 cGy in 35/22 treatments targeted to the prostate bed/pelvic lymph nodes using IMRT/IGRT given from 3/1/2023- 4/18/2023.  He tolerated radiation therapy reasonably well with expected acute side effect.    The patient has been recovering well since completion of the radiation therapy.  He denies any issues at the time of evaluation related to therapy.  He is here for routine post therapy office follow-up.    Medications were reviewed and are up to date on EPIC.    The following portions of the patient's history were reviewed and updated as appropriate: allergies, current medications, past family history, past medical history, past social history, past surgical history and problem list.    Review of Systems:      General  Constitutional  Constitutional (WDL): Exceptions to WDL  Fatigue: Fatigue relieved by rest  EENT  Eye Disorders  Eye Disorder (WDL): All eye disorder elements are within defined limits (wears reading glasses)  Ear Disorders  Ear Disorder (WDL): Exceptions to WDL  Tinnitus: Mild symptoms OR intervention not indicated (bilateral ears)  Respiratory  Respiratory  Respiratory (WDL): All respiratory elements are within defined limits  Cardiovascular  Cardiovascular  Cardiovascular (WDL): All cardiovascular elements are within defined limits  Gastrointestinal  Gastrointestinal  Gastrointestinal (WDL): Exceptions to WDL  Diarrhea: Increase of less than 4 stools per day over baseline OR mild increase in ostomy output compared to  baseline  Musculoskeletal  Musculoskeletal and Connective Tissue Disorders  Musculoskeletal & Connective (WDL): All musculoskeletal & connective elements are within defined limits  Integumentary  Integumentary  Integumentary (WDL): All integumentary elements are within defined limits  Neurological  Neurosensory  Neurosensory (WDL): Exceptions to WDL  Peripheral Sensory Neuropathy: Absent or within normal limits (occasional left foot)  Genitourinary/Reproductive  Genitourinary  Genitourinary (WDL): Exceptions to WDL  Urinary Frequency: Present (nocturia x4)  Lymphatic  Lymph System Disorders  Lymph (WDL): All lymph elements are within defined limits  Pain  Pain Score: No Pain (0)  AUA Assessment  Over the Past Month  How often have you had a sensation of not emptying your bladder completely after you have finished urinating: Less than 1 time in 5: Score:1  How often have you had to urinate again less than 2 hours after you finshed urinating: Less than half the time: Score: 2  How often have you found you stopped and started again several times when you urinated: Less than 1 time in 5: Score: 1  How often have you found it difficult to postpone urine: About half the time: Score: 3  How often have you had a weak urinary stream: Not at all: Score: 0  How often have you had to push or starin to begin uriation: Not at all: Score: 0  How many times do you most typically get up to urinate from the time you went to bed at night until the time you got up in the morning?: 4 times, Score: 4  Total AUA Score: Degree of Severity: 8-19 is Moderate  If you had to spend the rest of your life with your urinary condition just the way it is now, how would you feel: Mostly Dissatisfied  Please check the appropriate box that describes your ability to have an erection: Unable to have an erection                                                           Accompanied by  Accompanied By: spouse    Objective:     PHYSICAL EXAMINATION:    BP  114/73 (BP Location: Right arm, Patient Position: Sitting)   Pulse 66   Temp 98  F (36.7  C)   Resp 16   Wt 88 kg (194 lb 1.6 oz)   SpO2 97%   BMI 26.32 kg/m      Gen: Alert, in NAD  Neck: Supple, full ROM, no LAD  Pulm: No wheezing, stridor or respiratory distress  CV: Well-perfused, no cyanosis, no pedal edema  Back: No step-offs or pain to palpation along the thoracolumbar spine  Rectal: Deferred  : Deferred  Musculoskeletal: Normal muscle bulk and tone  Skin: Normal color and turgor  Neurologic: A/Ox3, CN II-XII intact, normal gait and station  Psychiatric: Appropriate mood and affect     Prostate Specific Antigen Screen   Date Value Ref Range Status   02/03/2021 7.0 (H) 0.0 - 6.5 ng/mL Final   01/06/2020 5.6 0.0 - 6.5 ng/mL Final     PSA Tumor Marker   Date Value Ref Range Status   05/22/2023 <0.01 ng/mL Final     Comment:     No reference ranges have been established for patients over 80 years.   12/12/2022 0.14 ng/mL Final     Comment:     No reference ranges have been established for patients over 80 years.        Impression     Patient is an 82-year-old gentleman with a diagnosis of high risk prostate cancer, pathologic stage T3b N0 M0, [place] grade 4+5 = 9 involving both side of prostate gland and a pretherapy PSA of 7.0.  Patient is status post robotic assisted prostatectomy with pathology showed evidence of seminal vesicle invasion and positive margin in the left anterior apex with 4 negative lymph nodes.  Patient presented with a recent history of rising PSA to 0.14. Staging PSMA PET scans showed no evidence of recurrence.  He received hormone therapy and definitive external beam radiation therapy for his prostate cancer and completed radiation therapy 1 month ago.  The patient has been doing well with no clinical evidence of recurrence.    Assessment & Plan:     1.  Return to radiation oncology in 6 months for PSA and office follow-up.  Patient is also scheduled to have the second 6-month  Eligard shot by the end of June.    2.  Continue follow-up with Dr. Gabino Martines, urology and Dr. Ashlyn Barnes, PCP as planned    Face to face time  15 minutes with > 80% spent on consultation, education and coordination of care.      Deneen Evans MD  Department of Radiation Oncology   [facility]  Tel: [phone]  Page: [phone]    [address] [address]     [address] [place] [address]    CC:  Patient Care Team:  Ashlyn Barnes MD as PCP - General (Family Practice)  Deneen Evans MD as MD (Radiation Oncology)  Gabino Martines MD as MD (Urology)  Deneen Evans MD as Assigned Cancer Care Provider

## 2023-05-24 NOTE — PROGRESS NOTES
Oncology Rooming Note    May 24, 2023 11:19 AM   Chet Kathleen is a 82 year old male who presents for:    Chief Complaint   Patient presents with     Oncology Clinic Visit     Prostate Cancer     Follow up     Initial Vitals: /73 (BP Location: Right arm, Patient Position: Sitting)   Pulse 66   Temp 98  F (36.7  C)   Resp 16   Wt 88 kg (194 lb 1.6 oz)   SpO2 97%   BMI 26.32 kg/m   Estimated body mass index is 26.32 kg/m  as calculated from the following:    Height as of 10/6/20: 1.829 m (6').    Weight as of this encounter: 88 kg (194 lb 1.6 oz). Body surface area is 2.11 meters squared.  No Pain (0) Comment: Data Unavailable   No LMP for male patient.  Allergies reviewed: Yes  Medications reviewed: Yes    Medications: Medication refills not needed today.  Pharmacy name entered into [facility]: [facility] #[id number] [address] [place] [employer] [facility] AT [address] OF [facility] [employer]    Clinical concerns: follow up, still with fatigue, diarrhea and frequency of urine Dr. Evans was notified.      Luna Lester RN

## 2023-05-24 NOTE — LETTER
5/24/2023         RE: Chet Kathleen  [address] [place] [address]        Dear Colleague,    Thank you for referring your patient, Chet Kathleen, to the [facility] RADIATION ONCOLOGY [place]. Please see a copy of my visit note below.    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  Patient is status post robotic assisted prostatectomy with pathology showed evidence of seminal vesicle invasion and positive margin in the left anterior apex with 4 negative lymph nodes.  Patient presented with a recent history of rising PSA to 0.14.  Staging PSMA PET scans showed no evidence of recurrence.      TYPE OF RADIATION THERAPY ADMINISTERED:  7000/4400 cGy in 35/22 treatments targeted to the prostate bed/pelvic lymph nodes using IMRT/IGRT given from 3/1/2023- 4/18/2023.     INTERVAL SINCE COMPLETION OF RADIATION THERAPY: 1 month.      SUBJECTIVE:  Mr. Kathleen is a 82 year old male who has been in his usual state of health until recently.  The patient presented with abnormal elevation of PSA from 5.6 in 1/2020 to 7.0 on 2/3/2021 for which he was seeking further evaluation.  Transrectal ultrasound study and biopsy on 4/16/2021 reviewed adenocarcinoma, Delf[place] grade 4+5 = 9, 4+3 = 7 and 3+3 = 6 involving both side of prostate gland.  Patient underwent staging work-up including CT abdomen pelvis and bone scan on 4/28/2021 which showed no evidence of lymph nodes and systemic metastasis.  Patient received robotic assisted radical prostatectomy on 5/27/2021.  The pathology revealed prostatic adenocarcinoma, [place] grade 4+5 = 9 involving both side of prostate gland with evidence of extraprostatic extension in the right anterior base and the left anterior apex,  tumor invasion into left and right seminal vesicle and right vas deferens, and positive margin in the left anterior apex.  There is also evidence of lymphovascular invasion.  4 removed pelvic lymph nodes showed no evidence of metastatic disease.  Patient has been recovering well since surgery.  He has only occasional stress incontinence and wearing a pad as needed.  He is PSA has been undetectable since surgery.  He was find to have detectable PSA to 0.14 on 11/11/2022.  The PSMA PET scan on 12/28/2022 showed no evidence of recurrence.  Patient elected proceed with hormone therapy with definitive external beam radiation therapy as his treatment choice and received radiation therapy in our clinic with a total dose of 7000/4400 cGy in 35/22 treatments targeted to the prostate bed/pelvic lymph nodes using IMRT/IGRT given from 3/1/2023- 4/18/2023.  He tolerated radiation therapy reasonably well with expected acute side effect.    The patient has been recovering well since completion of the radiation therapy.  He denies any issues at the time of evaluation related to therapy.  He is here for routine post therapy office follow-up.    Medications were reviewed and are up to date on EPIC.    The following portions of the patient's history were reviewed and updated as appropriate: allergies, current medications, past family history, past medical history, past social history, past surgical history and problem list.    Review of Systems:      General  Constitutional  Constitutional (WDL): Exceptions to WDL  Fatigue: Fatigue relieved by rest  EENT  Eye Disorders  Eye Disorder (WDL): All eye disorder elements are within defined limits (wears reading glasses)  Ear Disorders  Ear Disorder (WDL): Exceptions to WDL  Tinnitus: Mild symptoms OR intervention not indicated (bilateral ears)  Respiratory  Respiratory  Respiratory (WDL): All respiratory elements are within defined limits  Cardiovascular  Cardiovascular  Cardiovascular  (WDL): All cardiovascular elements are within defined limits  Gastrointestinal  Gastrointestinal  Gastrointestinal (WDL): Exceptions to WDL  Diarrhea: Increase of less than 4 stools per day over baseline OR mild increase in ostomy output compared to baseline  Musculoskeletal  Musculoskeletal and Connective Tissue Disorders  Musculoskeletal & Connective (WDL): All musculoskeletal & connective elements are within defined limits  Integumentary  Integumentary  Integumentary (WDL): All integumentary elements are within defined limits  Neurological  Neurosensory  Neurosensory (WDL): Exceptions to WDL  Peripheral Sensory Neuropathy: Absent or within normal limits (occasional left foot)  Genitourinary/Reproductive  Genitourinary  Genitourinary (WDL): Exceptions to WDL  Urinary Frequency: Present (nocturia x4)  Lymphatic  Lymph System Disorders  Lymph (WDL): All lymph elements are within defined limits  Pain  Pain Score: No Pain (0)  AUA Assessment  Over the Past Month  How often have you had a sensation of not emptying your bladder completely after you have finished urinating: Less than 1 time in 5: Score:1  How often have you had to urinate again less than 2 hours after you finshed urinating: Less than half the time: Score: 2  How often have you found you stopped and started again several times when you urinated: Less than 1 time in 5: Score: 1  How often have you found it difficult to postpone urine: About half the time: Score: 3  How often have you had a weak urinary stream: Not at all: Score: 0  How often have you had to push or starin to begin uriation: Not at all: Score: 0  How many times do you most typically get up to urinate from the time you went to bed at night until the time you got up in the morning?: 4 times, Score: 4  Total AUA Score: Degree of Severity: 8-19 is Moderate  If you had to spend the rest of your life with your urinary condition just the way it is now, how would you feel: Mostly  Dissatisfied  Please check the appropriate box that describes your ability to have an erection: Unable to have an erection                                                           Accompanied by  Accompanied By: spouse    Objective:     PHYSICAL EXAMINATION:    /73 (BP Location: Right arm, Patient Position: Sitting)   Pulse 66   Temp 98  F (36.7  C)   Resp 16   Wt 88 kg (194 lb 1.6 oz)   SpO2 97%   BMI 26.32 kg/m      Gen: Alert, in NAD  Neck: Supple, full ROM, no LAD  Pulm: No wheezing, stridor or respiratory distress  CV: Well-perfused, no cyanosis, no pedal edema  Back: No step-offs or pain to palpation along the thoracolumbar spine  Rectal: Deferred  : Deferred  Musculoskeletal: Normal muscle bulk and tone  Skin: Normal color and turgor  Neurologic: A/Ox3, CN II-XII intact, normal gait and station  Psychiatric: Appropriate mood and affect     Prostate Specific Antigen Screen   Date Value Ref Range Status   02/03/2021 7.0 (H) 0.0 - 6.5 ng/mL Final   01/06/2020 5.6 0.0 - 6.5 ng/mL Final     PSA Tumor Marker   Date Value Ref Range Status   05/22/2023 <0.01 ng/mL Final     Comment:     No reference ranges have been established for patients over 80 years.   12/12/2022 0.14 ng/mL Final     Comment:     No reference ranges have been established for patients over 80 years.        Impression     Patient is an 82-year-old gentleman with a diagnosis of high risk prostate cancer, pathologic stage T3b N0 M0, [place] grade 4+5 = 9 involving both side of prostate gland and a pretherapy PSA of 7.0.  Patient is status post robotic assisted prostatectomy with pathology showed evidence of seminal vesicle invasion and positive margin in the left anterior apex with 4 negative lymph nodes.  Patient presented with a recent history of rising PSA to 0.14. Staging PSMA PET scans showed no evidence of recurrence.  He received hormone therapy and definitive external beam radiation therapy for his prostate cancer and  completed radiation therapy 1 month ago.  The patient has been doing well with no clinical evidence of recurrence.    Assessment & Plan:     1.  Return to radiation oncology in 6 months for PSA and office follow-up.  Patient is also scheduled to have the second 6-month Eligard shot by the end of June.    2.  Continue follow-up with Dr. Gabino Martines, urology and Dr. Ashlyn Barnes, PCP as planned    Face to face time  15 minutes with > 80% spent on consultation, education and coordination of care.      Deneen Evans MD  Department of Radiation Oncology   [facility]  Tel: [phone]  Page: [phone]    [facility]  [address] [place] [address]     [facility]   [address] [place] [address]    CC:  Patient Care Team:  Ashlyn Barnes MD as PCP - General (Family Practice)  Deneen Evans MD as MD (Radiation Oncology)  Gabino Martines MD as MD (Urology)  Deneen Evans MD as Assigned Cancer Care Provider    Oncology Rooming Note    May 24, 2023 11:19 AM   Ceht Kathleen is a 82 year old male who presents for:    Chief Complaint   Patient presents with     Oncology Clinic Visit     Prostate Cancer     Follow up     Initial Vitals: /73 (BP Location: Right arm, Patient Position: Sitting)   Pulse 66   Temp 98  F (36.7  C)   Resp 16   Wt 88 kg (194 lb 1.6 oz)   SpO2 97%   BMI 26.32 kg/m   Estimated body mass index is 26.32 kg/m  as calculated from the following:    Height as of 10/6/20: 1.829 m (6').    Weight as of this encounter: 88 kg (194 lb 1.6 oz). Body surface area is 2.11 meters squared.  No Pain (0) Comment: Data Unavailable   No LMP for male patient.  Allergies reviewed: Yes  Medications reviewed: Yes    Medications: Medication refills not needed today.  Pharmacy name entered into [employer]: [id number] - [place], [place] - [address]    Clinical concerns: follow up, still with fatigue, diarrhea and frequency of urine  Dr. Evans was notified.      Luna Lester RN                Again, thank you for allowing me to participate in the care of your patient.        Sincerely,        Deneen Evans MD

## 2023-05-31 ENCOUNTER — LAB REQUISITION (OUTPATIENT)
Dept: LAB | Facility: CLINIC | Age: 83
End: 2023-05-31
Payer: COMMERCIAL

## 2023-05-31 DIAGNOSIS — R53.83 OTHER FATIGUE: ICD-10-CM

## 2023-05-31 LAB
ALBUMIN SERPL BCG-MCNC: 4.4 G/DL (ref 3.5–5.2)
ALP SERPL-CCNC: 79 U/L (ref 40–129)
ALT SERPL W P-5'-P-CCNC: 34 U/L (ref 10–50)
ANION GAP SERPL CALCULATED.3IONS-SCNC: 14 MMOL/L (ref 7–15)
AST SERPL W P-5'-P-CCNC: 26 U/L (ref 10–50)
BILIRUB SERPL-MCNC: 0.4 MG/DL
BUN SERPL-MCNC: 22 MG/DL (ref 8–23)
CALCIUM SERPL-MCNC: 9.4 MG/DL (ref 8.8–10.2)
CHLORIDE SERPL-SCNC: 107 MMOL/L (ref 98–107)
CREAT SERPL-MCNC: 1.22 MG/DL (ref 0.67–1.17)
DEPRECATED HCO3 PLAS-SCNC: 20 MMOL/L (ref 22–29)
GFR SERPL CREATININE-BSD FRML MDRD: 60 ML/MIN/1.73M2
GLUCOSE SERPL-MCNC: 100 MG/DL (ref 70–99)
POTASSIUM SERPL-SCNC: 4.4 MMOL/L (ref 3.4–5.3)
PROT SERPL-MCNC: 6 G/DL (ref 6.4–8.3)
SODIUM SERPL-SCNC: 141 MMOL/L (ref 136–145)
TSH SERPL DL<=0.005 MIU/L-ACNC: 1.67 UIU/ML (ref 0.3–4.2)

## 2023-05-31 PROCEDURE — 83550 IRON BINDING TEST: CPT | Mod: ORL | Performed by: STUDENT IN AN ORGANIZED HEALTH CARE EDUCATION/TRAINING PROGRAM

## 2023-05-31 PROCEDURE — 82728 ASSAY OF FERRITIN: CPT | Mod: ORL | Performed by: STUDENT IN AN ORGANIZED HEALTH CARE EDUCATION/TRAINING PROGRAM

## 2023-05-31 PROCEDURE — 84443 ASSAY THYROID STIM HORMONE: CPT | Mod: ORL | Performed by: STUDENT IN AN ORGANIZED HEALTH CARE EDUCATION/TRAINING PROGRAM

## 2023-05-31 PROCEDURE — 80053 COMPREHEN METABOLIC PANEL: CPT | Mod: ORL | Performed by: STUDENT IN AN ORGANIZED HEALTH CARE EDUCATION/TRAINING PROGRAM

## 2023-05-31 PROCEDURE — 82607 VITAMIN B-12: CPT | Mod: ORL | Performed by: STUDENT IN AN ORGANIZED HEALTH CARE EDUCATION/TRAINING PROGRAM

## 2023-05-31 PROCEDURE — 82248 BILIRUBIN DIRECT: CPT | Mod: ORL | Performed by: STUDENT IN AN ORGANIZED HEALTH CARE EDUCATION/TRAINING PROGRAM

## 2023-06-02 ENCOUNTER — LAB REQUISITION (OUTPATIENT)
Dept: LAB | Facility: CLINIC | Age: 83
End: 2023-06-02
Payer: COMMERCIAL

## 2023-06-02 DIAGNOSIS — R53.83 OTHER FATIGUE: ICD-10-CM

## 2023-06-03 LAB
BILIRUB DIRECT SERPL-MCNC: <0.2 MG/DL (ref 0–0.3)
BILIRUB SERPL-MCNC: 0.4 MG/DL
FERRITIN SERPL-MCNC: 528 NG/ML (ref 31–409)
IRON BINDING CAPACITY (ROCHE): 244 UG/DL (ref 240–430)
IRON SATN MFR SERPL: 61 % (ref 15–46)
IRON SERPL-MCNC: 149 UG/DL (ref 61–157)
VIT B12 SERPL-MCNC: 325 PG/ML (ref 232–1245)

## 2023-06-05 ENCOUNTER — LAB REQUISITION (OUTPATIENT)
Dept: LAB | Facility: CLINIC | Age: 83
End: 2023-06-05
Payer: COMMERCIAL

## 2023-06-05 DIAGNOSIS — D64.9 ANEMIA, UNSPECIFIED: ICD-10-CM

## 2023-06-05 LAB
RETICS # AUTO: 0.07 10E6/UL (ref 0.03–0.1)
RETICS/RBC NFR AUTO: 1.8 % (ref 0.5–2)

## 2023-06-05 PROCEDURE — 85045 AUTOMATED RETICULOCYTE COUNT: CPT | Mod: ORL | Performed by: STUDENT IN AN ORGANIZED HEALTH CARE EDUCATION/TRAINING PROGRAM

## 2023-06-28 ENCOUNTER — INFUSION THERAPY VISIT (OUTPATIENT)
Dept: INFUSION THERAPY | Facility: CLINIC | Age: 83
End: 2023-06-28
Attending: RADIOLOGY
Payer: COMMERCIAL

## 2023-06-28 VITALS
DIASTOLIC BLOOD PRESSURE: 80 MMHG | SYSTOLIC BLOOD PRESSURE: 160 MMHG | RESPIRATION RATE: 16 BRPM | HEART RATE: 54 BPM | TEMPERATURE: 98 F

## 2023-06-28 DIAGNOSIS — C61 MALIGNANT NEOPLASM OF PROSTATE (H): Primary | ICD-10-CM

## 2023-06-28 PROCEDURE — 250N000011 HC RX IP 250 OP 636: Mod: JZ | Performed by: RADIOLOGY

## 2023-06-28 PROCEDURE — 96402 CHEMO HORMON ANTINEOPL SQ/IM: CPT

## 2023-06-28 RX ADMIN — LEUPROLIDE ACETATE 45 MG: KIT at 11:22

## 2023-06-28 NOTE — PROGRESS NOTES
Infusion Nursing Note:  Chet Kathleen presents today for IM lupron.    Patient seen by provider today: No   present during visit today: Not Applicable.    Note: Tolerated injection well, offers no complaints.      Intravenous Access:  Peripheral IV placed.    Treatment Conditions:  Not Applicable.      Post Infusion Assessment:  Patient tolerated injection without incident.       Discharge Plan:   Patient and/or family verbalized understanding of discharge instructions and all questions answered.      María Elena Sow RN

## 2023-07-20 ENCOUNTER — LAB REQUISITION (OUTPATIENT)
Dept: LAB | Facility: CLINIC | Age: 83
End: 2023-07-20
Payer: COMMERCIAL

## 2023-07-20 DIAGNOSIS — Z00.00 ENCOUNTER FOR GENERAL ADULT MEDICAL EXAMINATION WITHOUT ABNORMAL FINDINGS: ICD-10-CM

## 2023-07-20 LAB
CHOLEST SERPL-MCNC: 206 MG/DL
HDLC SERPL-MCNC: 58 MG/DL
LDLC SERPL CALC-MCNC: 121 MG/DL
NONHDLC SERPL-MCNC: 148 MG/DL
TRIGL SERPL-MCNC: 133 MG/DL

## 2023-07-20 PROCEDURE — 80061 LIPID PANEL: CPT | Mod: ORL | Performed by: FAMILY MEDICINE

## 2023-11-10 ENCOUNTER — LAB (OUTPATIENT)
Dept: INFUSION THERAPY | Facility: CLINIC | Age: 83
End: 2023-11-10
Attending: RADIOLOGY
Payer: COMMERCIAL

## 2023-11-10 DIAGNOSIS — C61 MALIGNANT NEOPLASM OF PROSTATE (H): ICD-10-CM

## 2023-11-10 LAB — PSA SERPL DL<=0.01 NG/ML-MCNC: <0.01 NG/ML

## 2023-11-10 PROCEDURE — 36415 COLL VENOUS BLD VENIPUNCTURE: CPT

## 2023-11-10 PROCEDURE — 84153 ASSAY OF PSA TOTAL: CPT

## 2023-11-11 ENCOUNTER — HOSPITAL ENCOUNTER (EMERGENCY)
Facility: CLINIC | Age: 83
Discharge: HOME | End: 2023-11-11
Attending: EMERGENCY MEDICINE | Admitting: EMERGENCY MEDICINE
Payer: COMMERCIAL

## 2023-11-11 ENCOUNTER — APPOINTMENT (OUTPATIENT)
Dept: RADIOLOGY | Facility: CLINIC | Age: 83
End: 2023-11-11
Attending: FAMILY MEDICINE
Payer: COMMERCIAL

## 2023-11-11 VITALS
OXYGEN SATURATION: 94 % | TEMPERATURE: 98.5 F | SYSTOLIC BLOOD PRESSURE: 141 MMHG | HEART RATE: 76 BPM | RESPIRATION RATE: 18 BRPM | DIASTOLIC BLOOD PRESSURE: 78 MMHG

## 2023-11-11 DIAGNOSIS — J40 BRONCHITIS: ICD-10-CM

## 2023-11-11 LAB
FLUAV RNA SPEC QL NAA+PROBE: NEGATIVE
FLUBV RNA RESP QL NAA+PROBE: NEGATIVE
RSV RNA SPEC NAA+PROBE: NEGATIVE
SARS-COV-2 RNA RESP QL NAA+PROBE: NEGATIVE

## 2023-11-11 PROCEDURE — 87637 SARSCOV2&INF A&B&RSV AMP PRB: CPT | Performed by: FAMILY MEDICINE

## 2023-11-11 PROCEDURE — 99284 EMERGENCY DEPT VISIT MOD MDM: CPT

## 2023-11-11 PROCEDURE — 250N000013 HC RX MED GY IP 250 OP 250 PS 637: Performed by: EMERGENCY MEDICINE

## 2023-11-11 PROCEDURE — 71046 X-RAY EXAM CHEST 2 VIEWS: CPT

## 2023-11-11 RX ORDER — CEFDINIR 300 MG/1
300 CAPSULE ORAL ONCE
Status: COMPLETED | OUTPATIENT
Start: 2023-11-11 | End: 2023-11-11

## 2023-11-11 RX ORDER — CEFDINIR 300 MG/1
300 CAPSULE ORAL 2 TIMES DAILY
Qty: 14 CAPSULE | Refills: 0 | Status: SHIPPED | OUTPATIENT
Start: 2023-11-11 | End: 2023-11-11

## 2023-11-11 RX ORDER — CEFDINIR 300 MG/1
300 CAPSULE ORAL 2 TIMES DAILY
Qty: 14 CAPSULE | Refills: 0 | Status: SHIPPED | OUTPATIENT
Start: 2023-11-11

## 2023-11-11 RX ADMIN — CEFDINIR 300 MG: 300 CAPSULE ORAL at 22:13

## 2023-11-11 ASSESSMENT — ENCOUNTER SYMPTOMS
COUGH: 1
SORE THROAT: 0

## 2023-11-11 ASSESSMENT — ACTIVITIES OF DAILY LIVING (ADL): ADLS_ACUITY_SCORE: 35

## 2023-11-12 NOTE — DISCHARGE INSTRUCTIONS
Take medication as prescribed.  Monitor for improvement of symptoms.  Return to emergency department symptoms worsen.

## 2023-11-12 NOTE — ED TRIAGE NOTES
Pt presents with cough and cold like symptoms for the last 2-3 days. ABCs intact.      Triage Assessment (Adult)       Row Name 11/11/23 2011          Triage Assessment    Airway WDL WDL        Respiratory WDL    Respiratory WDL X;cough     Cough Frequency frequent     Cough Type no productive sputum        Skin Circulation/Temperature WDL    Skin Circulation/Temperature WDL WDL        Cardiac WDL    Cardiac WDL WDL        Peripheral/Neurovascular WDL    Peripheral Neurovascular WDL WDL        Cognitive/Neuro/Behavioral WDL    Cognitive/Neuro/Behavioral WDL WDL

## 2023-11-12 NOTE — ED PROVIDER NOTES
EMERGENCY DEPARTMENT ENCOUNTER      NAME: Chet Kathleen  AGE: 83 year old male  YOB: 1940  MRN: [id number]  EVALUATION DATE & TIME: 2023  8:20 PM    PCP: Ashlyn Barnes    ED PROVIDER: Russel Bae MD      Chief Complaint   Patient presents with    Cough     FINAL IMPRESSION:  1. Bronchitis      ED COURSE & MEDICAL DECISION MAKING:    Pertinent Labs & Imaging studies reviewed. (See chart for details)  83 year old male presents to the Emergency Department for evaluation of cough, congestion.  Symptoms escalating over the past 1 to 2 days.  Patient did do a home COVID test which is negative.  On examination noted to be mildly hypertensive.  Breath sounds overall appropriate on auscultation I did not appreciate a productive cough.  Complicating patient's presentation is yes to had [place] secondary to a .  That is one of his primary concerns.  Viral testing was negative.  X-ray was negative for acute pneumonia.  I did opt to cover him with antibiotics especially since he was traveling out of state and felt this was the safest plan of care.  Overall I think patient appropriate for discharge home with recommendations for close monitoring and follow-up on outpatient basis.      8:26 PM I met with the patient, obtained history, performed an initial exam, and discussed options and plan for diagnostics and treatment here in the ED.         At the conclusion of the encounter I discussed the results of all of the tests and the disposition. The questions were answered. The patient or family acknowledged understanding and was agreeable with the care plan.       Medical Decision Making    History:  Supplemental history from: Documented in chart, if applicable  External Record(s) reviewed: Documented in chart, if applicable.    Work Up:  Chart documentation includes differential considered and any EKGs or imaging independently interpreted by provider, where specified.  In additional to work  up documented, I considered the following work up: Documented in chart, if applicable.    External consultation:  Discussion of management with another provider: Documented in chart, if applicable    Complicating factors:  Care impacted by chronic illness: Cancer/Chemotherapy, Hyperlipidemia, and Hypertension  Care affected by social determinants of health: N/A    Disposition considerations: Discharge. I prescribed additional prescription strength medication(s) as charted. See documentation for any additional details.        MEDICATIONS GIVEN IN THE EMERGENCY:  Medications   cefdinir (OMNICEF) capsule 300 mg (has no administration in time range)       NEW PRESCRIPTIONS STARTED AT TODAY'S ER VISIT  Current Discharge Medication List        START taking these medications    Details   cefdinir (OMNICEF) 300 MG capsule Take 1 capsule (300 mg) by mouth 2 times daily  Qty: 14 capsule, Refills: 0                =================================================================    HPI    Patient information was obtained from: patient     Use of : N/A        Chet Kathleen is a 83 year old male with a pertinent history of prostate cancer, hypertension, trigeminal neuralgia, thyroidectomy, and cholecystectomy who presents to this ED by walking for evaluation of a cough.    Patient reports a non-productive cough and general malaise for the past 2 days. He says he also has body aches. Patient is in remission for prostate cancer. He did a home Covid test and it was negative.  Patient denies any sore throat.      REVIEW OF SYSTEMS   Review of Systems   HENT:  Negative for sore throat.    Respiratory:  Positive for cough.    Musculoskeletal:         Body aches   All other systems reviewed and are negative.       PAST MEDICAL HISTORY:  Past Medical History:   Diagnosis Date    Cancer (H)     Thyroid    Hyperlipidemia     Hypertension     Trigeminal neuralgia        PAST SURGICAL HISTORY:  Past Surgical History:   Procedure  Laterality Date    CHOLECYSTECTOMY      INGUINAL HERNIA REPAIR Bilateral 10/6/2020    Procedure: BILATERAL INGUINAL HERNIA REPAIR;  Surgeon: Melvin Allan MD;  Location: [facility] OR;  Service: General    THYROIDECTOMY             CURRENT MEDICATIONS:    cefdinir (OMNICEF) 300 MG capsule  ACETAMINOPHEN PO  carBAMazepine (TEGRETOL) 200 MG tablet  CARBAMAZEPINE PO  CARBAMAZEPINE  citalopram (CELEXA) 20 MG tablet  EPINEPHrine (EPIPEN) 0.3 MG/0.3ML injection  ibuprofen (ADVIL,MOTRIN) 200 MG tablet  LEUPROLIDE ACETATE, 6 MONTH, SC  lisinopril (ZESTRIL) 10 MG tablet  Loperamide HCl (IMODIUM A-D PO)  loratadine (CLARITIN) 10 MG tablet  OMEPRAZOLE PO  oxybutynin (DITROPAN) 5 MG tablet  SIMVASTATIN PO  tamsulosin (FLOMAX) 0.4 MG capsule  zolpidem (AMBIEN) 5 MG tablet        ALLERGIES:  Allergies   Allergen Reactions    Amoxicillin-Pot Clavulanate GI Disturbance     GI side effects.     Animal Dander Other (See Comments)     Cats , dogs     Germanium Unknown    Grass Extracts [Gramineae Pollens] Itching and Other (See Comments)     Grass/weeds     Trazodone Anxiety     Jumpy , anxious        FAMILY HISTORY:  No family history on file.    SOCIAL HISTORY:   Social History     Socioeconomic History    Marital status:      Spouse name: None    Number of children: None    Years of education: None    Highest education level: None   Tobacco Use    Smoking status: Never    Smokeless tobacco: Never   Substance and Sexual Activity    Alcohol use: Not Currently    Drug use: Never       VITALS:  BP (!) 141/78   Pulse 71   Temp 98.5  F (36.9  C) (Temporal)   Resp 18   SpO2 94%     PHYSICAL EXAM    PHYSICAL EXAM    VITAL SIGNS: BP (!) 141/78   Pulse 71   Temp 98.5  F (36.9  C) (Temporal)   Resp 18   SpO2 94%   Constitutional:  Well developed, well nourished,   EYES: Conjunctivae clear, no discharge  HENT: Atraumatic, normocephalic, bilateral external ears normal.  Oropharynx moist. Nose normal.   Neck: Normal ROM  , Supple   Respiratory:  No respiratory distress, normal nonlabored respirations. Cough, nonproductive  Cardiovascular:  Distal perfusion appears intact  Musculoskeletal:  No edema appreciated, Good range of motion in all major joints.   Integument:  Warm, Dry, No erythema, No rash.   Neurologic:  Alert and oriented. No focal deficits noted.  Ambulatory  Psychiatric:  Affect normal, Judgment normal, Mood normal.    LAB:  All pertinent labs reviewed and interpreted.  Results for orders placed or performed during the hospital encounter of 11/11/23   Chest XR,  PA & LAT    Impression    IMPRESSION: Negative chest.   Symptomatic Influenza A/B, RSV, & SARS-CoV2 PCR (COVID-19) Nasopharyngeal    Specimen: Nasopharyngeal; Swab   Result Value Ref Range    Influenza A PCR Negative Negative    Influenza B PCR Negative Negative    RSV PCR Negative Negative    SARS CoV2 PCR Negative Negative       RADIOLOGY:  Reviewed all pertinent imaging. Please see official radiology report.  Chest XR,  PA & LAT   Final Result   IMPRESSION: Negative chest.        I, Cindi Araya, am serving as a scribe to document services personally performed by Russel Bae MD based on my observation and the provider's statements to me. I, Russel Bae MD, attest that Cindi Araya is acting in a scribe capacity, has observed my performance of the services and has documented them in accordance with my direction.    Russel Bae MD  [facility] EMERGENCY ROOM  [address] [address] [phone]       Russel Bae MD  11/11/23 [id number]

## 2023-11-15 ENCOUNTER — LAB REQUISITION (OUTPATIENT)
Dept: LAB | Facility: CLINIC | Age: 83
End: 2023-11-15
Payer: COMMERCIAL

## 2023-11-15 DIAGNOSIS — R61 GENERALIZED HYPERHIDROSIS: ICD-10-CM

## 2023-11-15 LAB
ALBUMIN SERPL BCG-MCNC: 4.2 G/DL (ref 3.5–5.2)
ALP SERPL-CCNC: 93 U/L (ref 40–150)
ALT SERPL W P-5'-P-CCNC: 35 U/L (ref 0–70)
ANION GAP SERPL CALCULATED.3IONS-SCNC: 12 MMOL/L (ref 7–15)
AST SERPL W P-5'-P-CCNC: 27 U/L (ref 0–45)
BILIRUB SERPL-MCNC: 0.3 MG/DL
BUN SERPL-MCNC: 23 MG/DL (ref 8–23)
CALCIUM SERPL-MCNC: 9 MG/DL (ref 8.8–10.2)
CHLORIDE SERPL-SCNC: 106 MMOL/L (ref 98–107)
CREAT SERPL-MCNC: 1.19 MG/DL (ref 0.67–1.17)
DEPRECATED HCO3 PLAS-SCNC: 24 MMOL/L (ref 22–29)
EGFRCR SERPLBLD CKD-EPI 2021: 61 ML/MIN/1.73M2
GLUCOSE SERPL-MCNC: 97 MG/DL (ref 70–99)
POTASSIUM SERPL-SCNC: 4.1 MMOL/L (ref 3.4–5.3)
PROT SERPL-MCNC: 6.5 G/DL (ref 6.4–8.3)
SODIUM SERPL-SCNC: 142 MMOL/L (ref 135–145)
TSH SERPL DL<=0.005 MIU/L-ACNC: 1.72 UIU/ML (ref 0.3–4.2)

## 2023-11-15 PROCEDURE — 80053 COMPREHEN METABOLIC PANEL: CPT | Mod: ORL | Performed by: FAMILY MEDICINE

## 2023-11-15 PROCEDURE — 84443 ASSAY THYROID STIM HORMONE: CPT | Mod: ORL | Performed by: FAMILY MEDICINE

## 2024-02-18 ENCOUNTER — HEALTH MAINTENANCE LETTER (OUTPATIENT)
Age: 84
End: 2024-02-18

## 2024-06-26 ENCOUNTER — LAB REQUISITION (OUTPATIENT)
Dept: LAB | Facility: CLINIC | Age: 84
End: 2024-06-26
Payer: COMMERCIAL

## 2024-06-26 DIAGNOSIS — R19.5 OTHER FECAL ABNORMALITIES: ICD-10-CM

## 2024-06-26 PROCEDURE — 87209 SMEAR COMPLEX STAIN: CPT | Mod: ORL | Performed by: STUDENT IN AN ORGANIZED HEALTH CARE EDUCATION/TRAINING PROGRAM

## 2024-06-26 PROCEDURE — 87507 IADNA-DNA/RNA PROBE TQ 12-25: CPT | Mod: ORL | Performed by: STUDENT IN AN ORGANIZED HEALTH CARE EDUCATION/TRAINING PROGRAM

## 2024-06-27 LAB

## 2024-09-18 ENCOUNTER — LAB REQUISITION (OUTPATIENT)
Dept: LAB | Facility: CLINIC | Age: 84
End: 2024-09-18
Payer: COMMERCIAL

## 2024-09-18 DIAGNOSIS — F41.9 ANXIETY DISORDER, UNSPECIFIED: ICD-10-CM

## 2024-09-18 DIAGNOSIS — D64.9 ANEMIA, UNSPECIFIED: ICD-10-CM

## 2024-09-18 PROCEDURE — 80048 BASIC METABOLIC PNL TOTAL CA: CPT | Mod: ORL | Performed by: FAMILY MEDICINE

## 2024-09-18 PROCEDURE — 80061 LIPID PANEL: CPT | Mod: ORL | Performed by: FAMILY MEDICINE

## 2024-09-19 LAB
ANION GAP SERPL CALCULATED.3IONS-SCNC: 12 MMOL/L (ref 7–15)
BUN SERPL-MCNC: 22.2 MG/DL (ref 8–23)
CALCIUM SERPL-MCNC: 9 MG/DL (ref 8.8–10.4)
CHLORIDE SERPL-SCNC: 106 MMOL/L (ref 98–107)
CHOLEST SERPL-MCNC: 180 MG/DL
CREAT SERPL-MCNC: 1.26 MG/DL (ref 0.67–1.17)
EGFRCR SERPLBLD CKD-EPI 2021: 57 ML/MIN/1.73M2
FASTING STATUS PATIENT QL REPORTED: ABNORMAL
FASTING STATUS PATIENT QL REPORTED: ABNORMAL
GLUCOSE SERPL-MCNC: 90 MG/DL (ref 70–99)
HCO3 SERPL-SCNC: 23 MMOL/L (ref 22–29)
HDLC SERPL-MCNC: 53 MG/DL
LDLC SERPL CALC-MCNC: 100 MG/DL
NONHDLC SERPL-MCNC: 127 MG/DL
POTASSIUM SERPL-SCNC: 4.4 MMOL/L (ref 3.4–5.3)
SODIUM SERPL-SCNC: 141 MMOL/L (ref 135–145)
TRIGL SERPL-MCNC: 133 MG/DL

## 2024-12-11 ENCOUNTER — LAB REQUISITION (OUTPATIENT)
Dept: LAB | Facility: CLINIC | Age: 84
End: 2024-12-11
Payer: COMMERCIAL

## 2024-12-11 DIAGNOSIS — Z00.00 ENCOUNTER FOR GENERAL ADULT MEDICAL EXAMINATION WITHOUT ABNORMAL FINDINGS: ICD-10-CM

## 2024-12-12 LAB
ANION GAP SERPL CALCULATED.3IONS-SCNC: 11 MMOL/L (ref 7–15)
BUN SERPL-MCNC: 24.7 MG/DL (ref 8–23)
CALCIUM SERPL-MCNC: 9.2 MG/DL (ref 8.8–10.4)
CHLORIDE SERPL-SCNC: 107 MMOL/L (ref 98–107)
CREAT SERPL-MCNC: 1.32 MG/DL (ref 0.67–1.17)
EGFRCR SERPLBLD CKD-EPI 2021: 54 ML/MIN/1.73M2
GLUCOSE SERPL-MCNC: 99 MG/DL (ref 70–99)
HCO3 SERPL-SCNC: 24 MMOL/L (ref 22–29)
POTASSIUM SERPL-SCNC: 4.3 MMOL/L (ref 3.4–5.3)
SODIUM SERPL-SCNC: 142 MMOL/L (ref 135–145)
T4 FREE SERPL-MCNC: 0.8 NG/DL (ref 0.9–1.7)
TSH SERPL DL<=0.005 MIU/L-ACNC: 2.66 UIU/ML (ref 0.3–4.2)
TSH SERPL DL<=0.005 MIU/L-ACNC: 2.66 UIU/ML (ref 0.3–4.2)

## 2025-03-09 ENCOUNTER — HEALTH MAINTENANCE LETTER (OUTPATIENT)
Age: 85
End: 2025-03-09

## 2025-04-02 ENCOUNTER — LAB REQUISITION (OUTPATIENT)
Dept: LAB | Facility: CLINIC | Age: 85
End: 2025-04-02
Payer: COMMERCIAL

## 2025-04-02 DIAGNOSIS — I10 ESSENTIAL (PRIMARY) HYPERTENSION: ICD-10-CM

## 2025-04-02 LAB
ANION GAP SERPL CALCULATED.3IONS-SCNC: 13 MMOL/L (ref 7–15)
BUN SERPL-MCNC: 24.4 MG/DL (ref 8–23)
CALCIUM SERPL-MCNC: 9.1 MG/DL (ref 8.8–10.4)
CHLORIDE SERPL-SCNC: 108 MMOL/L (ref 98–107)
CREAT SERPL-MCNC: 1.25 MG/DL (ref 0.67–1.17)
EGFRCR SERPLBLD CKD-EPI 2021: 57 ML/MIN/1.73M2
GLUCOSE SERPL-MCNC: 81 MG/DL (ref 70–99)
HCO3 SERPL-SCNC: 24 MMOL/L (ref 22–29)
POTASSIUM SERPL-SCNC: 3.9 MMOL/L (ref 3.4–5.3)
SODIUM SERPL-SCNC: 145 MMOL/L (ref 135–145)

## 2025-04-02 PROCEDURE — 80048 BASIC METABOLIC PNL TOTAL CA: CPT | Mod: ORL | Performed by: FAMILY MEDICINE

## 2025-07-02 ENCOUNTER — LAB REQUISITION (OUTPATIENT)
Dept: LAB | Facility: CLINIC | Age: 85
End: 2025-07-02
Payer: COMMERCIAL

## 2025-07-02 DIAGNOSIS — B02.29 OTHER POSTHERPETIC NERVOUS SYSTEM INVOLVEMENT: ICD-10-CM

## 2025-07-02 LAB
ALBUMIN SERPL BCG-MCNC: 4.2 G/DL (ref 3.5–5.2)
ALP SERPL-CCNC: 77 U/L (ref 40–150)
ALT SERPL W P-5'-P-CCNC: 21 U/L (ref 0–70)
ANION GAP SERPL CALCULATED.3IONS-SCNC: 11 MMOL/L (ref 7–15)
AST SERPL W P-5'-P-CCNC: 23 U/L (ref 0–45)
BILIRUB SERPL-MCNC: 0.4 MG/DL
BUN SERPL-MCNC: 25.7 MG/DL (ref 8–23)
CALCIUM SERPL-MCNC: 9.1 MG/DL (ref 8.8–10.4)
CHLORIDE SERPL-SCNC: 109 MMOL/L (ref 98–107)
CREAT SERPL-MCNC: 1.34 MG/DL (ref 0.67–1.17)
EGFRCR SERPLBLD CKD-EPI 2021: 53 ML/MIN/1.73M2
GLUCOSE SERPL-MCNC: 95 MG/DL (ref 70–99)
HCO3 SERPL-SCNC: 22 MMOL/L (ref 22–29)
POTASSIUM SERPL-SCNC: 4.2 MMOL/L (ref 3.4–5.3)
PROT SERPL-MCNC: 6.2 G/DL (ref 6.4–8.3)
SODIUM SERPL-SCNC: 142 MMOL/L (ref 135–145)

## 2025-07-02 PROCEDURE — 80053 COMPREHEN METABOLIC PANEL: CPT | Mod: ORL | Performed by: FAMILY MEDICINE
